# Patient Record
Sex: MALE | Race: BLACK OR AFRICAN AMERICAN | NOT HISPANIC OR LATINO
[De-identification: names, ages, dates, MRNs, and addresses within clinical notes are randomized per-mention and may not be internally consistent; named-entity substitution may affect disease eponyms.]

---

## 2017-02-01 ENCOUNTER — APPOINTMENT (OUTPATIENT)
Dept: NEPHROLOGY | Facility: CLINIC | Age: 52
End: 2017-02-01

## 2017-02-01 VITALS — SYSTOLIC BLOOD PRESSURE: 135 MMHG | HEART RATE: 76 BPM | DIASTOLIC BLOOD PRESSURE: 90 MMHG

## 2017-02-12 ENCOUNTER — FORM ENCOUNTER (OUTPATIENT)
Age: 52
End: 2017-02-12

## 2017-02-13 ENCOUNTER — OUTPATIENT (OUTPATIENT)
Dept: OUTPATIENT SERVICES | Facility: HOSPITAL | Age: 52
LOS: 1 days | End: 2017-02-13
Payer: MEDICAID

## 2017-02-13 PROCEDURE — 74160 CT ABDOMEN W/CONTRAST: CPT

## 2017-03-01 ENCOUNTER — LABORATORY RESULT (OUTPATIENT)
Age: 52
End: 2017-03-01

## 2017-03-01 ENCOUNTER — APPOINTMENT (OUTPATIENT)
Dept: NEPHROLOGY | Facility: CLINIC | Age: 52
End: 2017-03-01

## 2017-03-01 VITALS — SYSTOLIC BLOOD PRESSURE: 111 MMHG | DIASTOLIC BLOOD PRESSURE: 83 MMHG | HEART RATE: 84 BPM

## 2017-03-01 VITALS — SYSTOLIC BLOOD PRESSURE: 131 MMHG | DIASTOLIC BLOOD PRESSURE: 95 MMHG

## 2017-03-04 LAB
ALBUMIN SERPL ELPH-MCNC: 4.8 G/DL
ALP BLD-CCNC: 58 U/L
ALT SERPL-CCNC: 96 U/L
ANION GAP SERPL CALC-SCNC: 15 MMOL/L
APPEARANCE: CLEAR
AST SERPL-CCNC: 54 U/L
BASOPHILS # BLD AUTO: 0.02 K/UL
BASOPHILS NFR BLD AUTO: 0.4 %
BILIRUB SERPL-MCNC: 0.7 MG/DL
BILIRUBIN URINE: NEGATIVE
BLOOD URINE: NEGATIVE
BUN SERPL-MCNC: 14 MG/DL
C TRACH RRNA SPEC QL NAA+PROBE: NORMAL
CALCIUM SERPL-MCNC: 9.7 MG/DL
CALCIUM SERPL-MCNC: 9.7 MG/DL
CHLORIDE SERPL-SCNC: 100 MMOL/L
CHOLEST SERPL-MCNC: 185 MG/DL
CHOLEST/HDLC SERPL: 3.9 RATIO
CO2 SERPL-SCNC: 26 MMOL/L
COLOR: YELLOW
CREAT SERPL-MCNC: 1.19 MG/DL
CREAT SPEC-SCNC: 124 MG/DL
EOSINOPHIL # BLD AUTO: 0.05 K/UL
EOSINOPHIL NFR BLD AUTO: 1 %
GLUCOSE QUALITATIVE U: NORMAL MG/DL
GLUCOSE SERPL-MCNC: 113 MG/DL
HBA1C MFR BLD HPLC: 5.9 %
HBV SURFACE AG SER QL: NONREACTIVE
HCT VFR BLD CALC: 43.5 %
HCV AB SER QL: NONREACTIVE
HCV S/CO RATIO: 0.28 S/CO
HDLC SERPL-MCNC: 48 MG/DL
HGB BLD-MCNC: 14.8 G/DL
HIV1+2 AB SPEC QL IA.RAPID: NONREACTIVE
IMM GRANULOCYTES NFR BLD AUTO: 0 %
KETONES URINE: NEGATIVE
LDLC SERPL CALC-MCNC: 111 MG/DL
LEUKOCYTE ESTERASE URINE: NEGATIVE
LYMPHOCYTES # BLD AUTO: 2.43 K/UL
LYMPHOCYTES NFR BLD AUTO: 48.3 %
MAGNESIUM SERPL-MCNC: 2.1 MG/DL
MAN DIFF?: NORMAL
MCHC RBC-ENTMCNC: 28.1 PG
MCHC RBC-ENTMCNC: 34 GM/DL
MCV RBC AUTO: 82.5 FL
MICROALBUMIN 24H UR DL<=1MG/L-MCNC: 0.7 MG/DL
MICROALBUMIN/CREAT 24H UR-RTO: 6 UG/MG
MONOCYTES # BLD AUTO: 0.32 K/UL
MONOCYTES NFR BLD AUTO: 6.4 %
N GONORRHOEA RRNA SPEC QL NAA+PROBE: NORMAL
NEUTROPHILS # BLD AUTO: 2.21 K/UL
NEUTROPHILS NFR BLD AUTO: 43.9 %
NITRITE URINE: NEGATIVE
PARATHYROID HORMONE INTACT: 27 PG/ML
PH URINE: 5.5
PHOSPHATE SERPL-MCNC: 3.2 MG/DL
PLATELET # BLD AUTO: 215 K/UL
POTASSIUM SERPL-SCNC: 4.1 MMOL/L
PROT SERPL-MCNC: 8.1 G/DL
PROTEIN URINE: NEGATIVE MG/DL
PSA SERPL-MCNC: 1.15 NG/ML
RBC # BLD: 5.27 M/UL
RBC # FLD: 13.7 %
SODIUM SERPL-SCNC: 141 MMOL/L
SOURCE AMPLIFICATION: NORMAL
SPECIFIC GRAVITY URINE: 1.02
T PALLIDUM AB SER QL IA: POSITIVE
TRIGL SERPL-MCNC: 131 MG/DL
TSH SERPL-ACNC: 3.85 UIU/ML
URATE SERPL-MCNC: 6 MG/DL
UROBILINOGEN URINE: NORMAL MG/DL
WBC # FLD AUTO: 5.03 K/UL

## 2017-04-06 ENCOUNTER — OTHER (OUTPATIENT)
Age: 52
End: 2017-04-06

## 2017-04-07 ENCOUNTER — APPOINTMENT (OUTPATIENT)
Dept: INTERNAL MEDICINE | Facility: CLINIC | Age: 52
End: 2017-04-07

## 2017-04-07 VITALS
SYSTOLIC BLOOD PRESSURE: 120 MMHG | DIASTOLIC BLOOD PRESSURE: 90 MMHG | WEIGHT: 255.8 LBS | HEIGHT: 72 IN | HEART RATE: 110 BPM | BODY MASS INDEX: 34.65 KG/M2 | TEMPERATURE: 97.9 F | OXYGEN SATURATION: 98 %

## 2017-04-18 ENCOUNTER — OTHER (OUTPATIENT)
Age: 52
End: 2017-04-18

## 2017-06-30 ENCOUNTER — LABORATORY RESULT (OUTPATIENT)
Age: 52
End: 2017-06-30

## 2017-06-30 ENCOUNTER — APPOINTMENT (OUTPATIENT)
Dept: NEPHROLOGY | Facility: CLINIC | Age: 52
End: 2017-06-30

## 2017-06-30 VITALS — HEART RATE: 85 BPM | SYSTOLIC BLOOD PRESSURE: 128 MMHG | DIASTOLIC BLOOD PRESSURE: 86 MMHG

## 2017-06-30 VITALS — HEART RATE: 86 BPM | SYSTOLIC BLOOD PRESSURE: 110 MMHG | DIASTOLIC BLOOD PRESSURE: 80 MMHG

## 2017-06-30 DIAGNOSIS — R76.8 OTHER SPECIFIED ABNORMAL IMMUNOLOGICAL FINDINGS IN SERUM: ICD-10-CM

## 2017-07-03 ENCOUNTER — OTHER (OUTPATIENT)
Age: 52
End: 2017-07-03

## 2017-07-06 ENCOUNTER — APPOINTMENT (OUTPATIENT)
Dept: INTERNAL MEDICINE | Facility: CLINIC | Age: 52
End: 2017-07-06

## 2017-07-06 ENCOUNTER — LABORATORY RESULT (OUTPATIENT)
Age: 52
End: 2017-07-06

## 2017-07-06 VITALS
HEIGHT: 72 IN | DIASTOLIC BLOOD PRESSURE: 95 MMHG | BODY MASS INDEX: 31.83 KG/M2 | TEMPERATURE: 98.7 F | OXYGEN SATURATION: 97 % | HEART RATE: 90 BPM | SYSTOLIC BLOOD PRESSURE: 120 MMHG | WEIGHT: 235 LBS

## 2017-07-06 DIAGNOSIS — R03.0 ELEVATED BLOOD-PRESSURE READING, W/OUT DIAGNOSIS OF HYPERTENSION: ICD-10-CM

## 2017-07-06 DIAGNOSIS — Z23 ENCOUNTER FOR IMMUNIZATION: ICD-10-CM

## 2017-07-06 DIAGNOSIS — J20.8 ACUTE BRONCHITIS DUE TO OTHER SPECIFIED ORGANISMS: ICD-10-CM

## 2017-07-06 DIAGNOSIS — Z87.898 PERSONAL HISTORY OF OTHER SPECIFIED CONDITIONS: ICD-10-CM

## 2017-07-06 DIAGNOSIS — N28.1 CYST OF KIDNEY, ACQUIRED: ICD-10-CM

## 2017-07-06 DIAGNOSIS — Z92.29 PERSONAL HISTORY OF OTHER DRUG THERAPY: ICD-10-CM

## 2017-07-06 DIAGNOSIS — Z87.448 PERSONAL HISTORY OF OTHER DISEASES OF URINARY SYSTEM: ICD-10-CM

## 2017-07-07 LAB
ALBUMIN SERPL ELPH-MCNC: 5 G/DL
ALP BLD-CCNC: 73 U/L
ALT SERPL-CCNC: 69 U/L
ANION GAP SERPL CALC-SCNC: 17 MMOL/L
AST SERPL-CCNC: 47 U/L
BASOPHILS # BLD AUTO: 0.01 K/UL
BASOPHILS NFR BLD AUTO: 0.2 %
BILIRUB SERPL-MCNC: 1 MG/DL
BUN SERPL-MCNC: 17 MG/DL
C TRACH RRNA SPEC QL NAA+PROBE: NORMAL
CALCIUM SERPL-MCNC: 9.9 MG/DL
CHLORIDE SERPL-SCNC: 103 MMOL/L
CHOLEST SERPL-MCNC: 149 MG/DL
CHOLEST/HDLC SERPL: 3.5 RATIO
CO2 SERPL-SCNC: 23 MMOL/L
CREAT SERPL-MCNC: 1.34 MG/DL
EOSINOPHIL # BLD AUTO: 0.08 K/UL
EOSINOPHIL NFR BLD AUTO: 1.5 %
GLUCOSE SERPL-MCNC: 92 MG/DL
HBA1C MFR BLD HPLC: 5.6 %
HCT VFR BLD CALC: 44.1 %
HCV AB SER QL: NONREACTIVE
HCV S/CO RATIO: 0.25 S/CO
HDLC SERPL-MCNC: 42 MG/DL
HGB BLD-MCNC: 14.8 G/DL
HIV1+2 AB SPEC QL IA.RAPID: NONREACTIVE
IMM GRANULOCYTES NFR BLD AUTO: 0.2 %
LDLC SERPL CALC-MCNC: 60 MG/DL
LYMPHOCYTES # BLD AUTO: 2 K/UL
LYMPHOCYTES NFR BLD AUTO: 37.2 %
MAN DIFF?: NORMAL
MCHC RBC-ENTMCNC: 28.4 PG
MCHC RBC-ENTMCNC: 33.6 GM/DL
MCV RBC AUTO: 84.6 FL
MONOCYTES # BLD AUTO: 0.33 K/UL
MONOCYTES NFR BLD AUTO: 6.1 %
N GONORRHOEA RRNA SPEC QL NAA+PROBE: NORMAL
NEUTROPHILS # BLD AUTO: 2.94 K/UL
NEUTROPHILS NFR BLD AUTO: 54.8 %
PLATELET # BLD AUTO: 180 K/UL
POTASSIUM SERPL-SCNC: 4.2 MMOL/L
PROT SERPL-MCNC: 8 G/DL
RBC # BLD: 5.21 M/UL
RBC # FLD: 14.4 %
SODIUM SERPL-SCNC: 143 MMOL/L
SOURCE AMPLIFICATION: NORMAL
TRIGL SERPL-MCNC: 233 MG/DL
TSH SERPL-ACNC: 3.36 UIU/ML
WBC # FLD AUTO: 5.37 K/UL

## 2017-07-09 LAB
25(OH)D3 SERPL-MCNC: 37.8 NG/ML
ALBUMIN SERPL ELPH-MCNC: 5.1 G/DL
ALP BLD-CCNC: 65 U/L
ALT SERPL-CCNC: 47 U/L
ANION GAP SERPL CALC-SCNC: 19 MMOL/L
APPEARANCE: CLEAR
AST SERPL-CCNC: 32 U/L
BASOPHILS # BLD AUTO: 0.02 K/UL
BASOPHILS NFR BLD AUTO: 0.4 %
BILIRUB SERPL-MCNC: 0.9 MG/DL
BILIRUBIN URINE: NEGATIVE
BLOOD URINE: ABNORMAL
BUN SERPL-MCNC: 17 MG/DL
CALCIUM SERPL-MCNC: 10.1 MG/DL
CALCIUM SERPL-MCNC: 10.1 MG/DL
CHLORIDE SERPL-SCNC: 100 MMOL/L
CHOLEST SERPL-MCNC: 152 MG/DL
CHOLEST/HDLC SERPL: 3.2 RATIO
CO2 SERPL-SCNC: 25 MMOL/L
COLOR: YELLOW
CREAT SERPL-MCNC: 1.4 MG/DL
CREAT SPEC-SCNC: 221 MG/DL
EOSINOPHIL # BLD AUTO: 0.13 K/UL
EOSINOPHIL NFR BLD AUTO: 2.5 %
GLUCOSE QUALITATIVE U: NORMAL MG/DL
GLUCOSE SERPL-MCNC: 104 MG/DL
HBA1C MFR BLD HPLC: 5.7 %
HCT VFR BLD CALC: 43.1 %
HDLC SERPL-MCNC: 48 MG/DL
HGB BLD-MCNC: 14.5 G/DL
IMM GRANULOCYTES NFR BLD AUTO: 0.2 %
KETONES URINE: NEGATIVE
LDLC SERPL CALC-MCNC: 88 MG/DL
LEUKOCYTE ESTERASE URINE: NEGATIVE
LYMPHOCYTES # BLD AUTO: 1.96 K/UL
LYMPHOCYTES NFR BLD AUTO: 37.3 %
MAGNESIUM SERPL-MCNC: 2.2 MG/DL
MAN DIFF?: NORMAL
MCHC RBC-ENTMCNC: 28.1 PG
MCHC RBC-ENTMCNC: 33.6 GM/DL
MCV RBC AUTO: 83.5 FL
MICROALBUMIN 24H UR DL<=1MG/L-MCNC: 1.6 MG/DL
MICROALBUMIN/CREAT 24H UR-RTO: 7 MG/G
MONOCYTES # BLD AUTO: 0.4 K/UL
MONOCYTES NFR BLD AUTO: 7.6 %
NEUTROPHILS # BLD AUTO: 2.74 K/UL
NEUTROPHILS NFR BLD AUTO: 52 %
NITRITE URINE: NEGATIVE
PARATHYROID HORMONE INTACT: 24 PG/ML
PH URINE: 5.5
PHOSPHATE SERPL-MCNC: 2.9 MG/DL
PLATELET # BLD AUTO: 177 K/UL
POTASSIUM SERPL-SCNC: 4.2 MMOL/L
PROT SERPL-MCNC: 7.9 G/DL
PROTEIN URINE: NEGATIVE MG/DL
RBC # BLD: 5.16 M/UL
RBC # FLD: 13.4 %
SODIUM SERPL-SCNC: 144 MMOL/L
SPECIFIC GRAVITY URINE: 1.02
T PALLIDUM AB SER DONR QL IF: REACTIVE
T PALLIDUM AB SER QL IA: POSITIVE
TRIGL SERPL-MCNC: 78 MG/DL
TSH SERPL-ACNC: 2.9 UIU/ML
URATE SERPL-MCNC: 6.1 MG/DL
UROBILINOGEN URINE: NORMAL MG/DL
WBC # FLD AUTO: 5.26 K/UL

## 2017-07-12 LAB — T PALLIDUM AB SER QL IA: POSITIVE

## 2017-09-06 ENCOUNTER — OTHER (OUTPATIENT)
Age: 52
End: 2017-09-06

## 2017-09-14 ENCOUNTER — TRANSCRIPTION ENCOUNTER (OUTPATIENT)
Age: 52
End: 2017-09-14

## 2017-09-18 ENCOUNTER — TRANSCRIPTION ENCOUNTER (OUTPATIENT)
Age: 52
End: 2017-09-18

## 2017-10-17 ENCOUNTER — LABORATORY RESULT (OUTPATIENT)
Age: 52
End: 2017-10-17

## 2017-10-17 ENCOUNTER — APPOINTMENT (OUTPATIENT)
Dept: INTERNAL MEDICINE | Facility: CLINIC | Age: 52
End: 2017-10-17
Payer: MEDICAID

## 2017-10-17 VITALS
SYSTOLIC BLOOD PRESSURE: 120 MMHG | WEIGHT: 227 LBS | TEMPERATURE: 97.5 F | HEART RATE: 92 BPM | DIASTOLIC BLOOD PRESSURE: 81 MMHG | OXYGEN SATURATION: 98 % | HEIGHT: 72 IN | BODY MASS INDEX: 30.75 KG/M2

## 2017-10-17 PROCEDURE — 36415 COLL VENOUS BLD VENIPUNCTURE: CPT

## 2017-10-17 PROCEDURE — 90686 IIV4 VACC NO PRSV 0.5 ML IM: CPT

## 2017-10-17 PROCEDURE — G0008: CPT

## 2017-10-17 PROCEDURE — 99214 OFFICE O/P EST MOD 30 MIN: CPT | Mod: 25

## 2017-10-18 LAB
ALBUMIN SERPL ELPH-MCNC: 5 G/DL
ALP BLD-CCNC: 64 U/L
ALT SERPL-CCNC: 53 U/L
ANION GAP SERPL CALC-SCNC: 12 MMOL/L
AST SERPL-CCNC: 47 U/L
BASOPHILS # BLD AUTO: 0.02 K/UL
BASOPHILS NFR BLD AUTO: 0.4 %
BILIRUB SERPL-MCNC: 0.6 MG/DL
BUN SERPL-MCNC: 18 MG/DL
C TRACH RRNA SPEC QL NAA+PROBE: NOT DETECTED
CALCIUM SERPL-MCNC: 9.8 MG/DL
CHLORIDE SERPL-SCNC: 103 MMOL/L
CO2 SERPL-SCNC: 24 MMOL/L
CREAT SERPL-MCNC: 1.41 MG/DL
EOSINOPHIL # BLD AUTO: 0.1 K/UL
EOSINOPHIL NFR BLD AUTO: 1.9 %
GLUCOSE SERPL-MCNC: 96 MG/DL
HBA1C MFR BLD HPLC: 5.7 %
HCT VFR BLD CALC: 41.9 %
HCV AB SER QL: NONREACTIVE
HCV S/CO RATIO: 0.18 S/CO
HGB BLD-MCNC: 14.5 G/DL
HIV1+2 AB SPEC QL IA.RAPID: NONREACTIVE
HSV 1+2 IGG SER IA-IMP: POSITIVE
HSV 1+2 IGG SER IA-IMP: POSITIVE
HSV1 IGG SER QL: 31.2 INDEX
HSV2 IGG SER QL: 3.18 INDEX
IMM GRANULOCYTES NFR BLD AUTO: 0.2 %
LYMPHOCYTES # BLD AUTO: 1.87 K/UL
LYMPHOCYTES NFR BLD AUTO: 36.5 %
MAN DIFF?: NORMAL
MCHC RBC-ENTMCNC: 28.9 PG
MCHC RBC-ENTMCNC: 34.6 GM/DL
MCV RBC AUTO: 83.5 FL
MONOCYTES # BLD AUTO: 0.26 K/UL
MONOCYTES NFR BLD AUTO: 5.1 %
N GONORRHOEA RRNA SPEC QL NAA+PROBE: NOT DETECTED
NEUTROPHILS # BLD AUTO: 2.87 K/UL
NEUTROPHILS NFR BLD AUTO: 55.9 %
PLATELET # BLD AUTO: 180 K/UL
POTASSIUM SERPL-SCNC: 4 MMOL/L
PROT SERPL-MCNC: 8.2 G/DL
RBC # BLD: 5.02 M/UL
RBC # FLD: 13.2 %
SODIUM SERPL-SCNC: 139 MMOL/L
SOURCE AMPLIFICATION: NORMAL
WBC # FLD AUTO: 5.13 K/UL

## 2017-10-30 ENCOUNTER — APPOINTMENT (OUTPATIENT)
Dept: NEPHROLOGY | Facility: CLINIC | Age: 52
End: 2017-10-30
Payer: MEDICAID

## 2017-10-30 VITALS — DIASTOLIC BLOOD PRESSURE: 104 MMHG | HEART RATE: 87 BPM | SYSTOLIC BLOOD PRESSURE: 125 MMHG

## 2017-10-30 VITALS — BODY MASS INDEX: 30.79 KG/M2 | WEIGHT: 227 LBS

## 2017-10-30 VITALS — SYSTOLIC BLOOD PRESSURE: 127 MMHG | DIASTOLIC BLOOD PRESSURE: 90 MMHG | HEART RATE: 76 BPM

## 2017-10-30 LAB — T PALLIDUM AB SER QL IA: POSITIVE

## 2017-10-30 PROCEDURE — 99215 OFFICE O/P EST HI 40 MIN: CPT

## 2017-11-03 LAB
APPEARANCE: CLEAR
BILIRUBIN URINE: NEGATIVE
BLOOD URINE: NEGATIVE
COLOR: YELLOW
CREAT SPEC-SCNC: 122 MG/DL
GLUCOSE QUALITATIVE U: NEGATIVE MG/DL
KETONES URINE: NEGATIVE
LEUKOCYTE ESTERASE URINE: NEGATIVE
MICROALBUMIN 24H UR DL<=1MG/L-MCNC: 0.8 MG/DL
MICROALBUMIN/CREAT 24H UR-RTO: 7 MG/G
NITRITE URINE: NEGATIVE
PH URINE: 5
PROTEIN URINE: NEGATIVE MG/DL
SPECIFIC GRAVITY URINE: 1.02
UROBILINOGEN URINE: NEGATIVE MG/DL

## 2017-11-07 ENCOUNTER — OTHER (OUTPATIENT)
Age: 52
End: 2017-11-07

## 2018-01-23 ENCOUNTER — LABORATORY RESULT (OUTPATIENT)
Age: 53
End: 2018-01-23

## 2018-01-23 ENCOUNTER — APPOINTMENT (OUTPATIENT)
Dept: INTERNAL MEDICINE | Facility: CLINIC | Age: 53
End: 2018-01-23
Payer: MEDICAID

## 2018-01-23 VITALS
TEMPERATURE: 98.7 F | OXYGEN SATURATION: 98 % | SYSTOLIC BLOOD PRESSURE: 127 MMHG | HEIGHT: 72 IN | DIASTOLIC BLOOD PRESSURE: 92 MMHG | HEART RATE: 94 BPM | WEIGHT: 243 LBS | BODY MASS INDEX: 32.91 KG/M2

## 2018-01-23 PROCEDURE — 36415 COLL VENOUS BLD VENIPUNCTURE: CPT

## 2018-01-23 PROCEDURE — 99214 OFFICE O/P EST MOD 30 MIN: CPT | Mod: 25

## 2018-01-24 LAB
ALBUMIN SERPL ELPH-MCNC: 4.9 G/DL
ALP BLD-CCNC: 58 U/L
ALT SERPL-CCNC: 58 U/L
ANION GAP SERPL CALC-SCNC: 16 MMOL/L
AST SERPL-CCNC: 36 U/L
BASOPHILS # BLD AUTO: 0.02 K/UL
BASOPHILS NFR BLD AUTO: 0.4 %
BILIRUB SERPL-MCNC: 0.6 MG/DL
BUN SERPL-MCNC: 18 MG/DL
CALCIUM SERPL-MCNC: 10.1 MG/DL
CHLORIDE SERPL-SCNC: 100 MMOL/L
CO2 SERPL-SCNC: 23 MMOL/L
CREAT SERPL-MCNC: 1.4 MG/DL
EOSINOPHIL # BLD AUTO: 0.09 K/UL
EOSINOPHIL NFR BLD AUTO: 1.7 %
GLUCOSE SERPL-MCNC: 100 MG/DL
HBA1C MFR BLD HPLC: 5.6 %
HCT VFR BLD CALC: 46.1 %
HCV AB SER QL: NONREACTIVE
HCV S/CO RATIO: 0.16 S/CO
HGB BLD-MCNC: 15.4 G/DL
HIV1+2 AB SPEC QL IA.RAPID: NONREACTIVE
HSV 1+2 IGG SER IA-IMP: POSITIVE
HSV 1+2 IGG SER IA-IMP: POSITIVE
HSV1 IGG SER QL: 31.8 INDEX
HSV2 IGG SER QL: 2.53 INDEX
IMM GRANULOCYTES NFR BLD AUTO: 0.4 %
LYMPHOCYTES # BLD AUTO: 2.33 K/UL
LYMPHOCYTES NFR BLD AUTO: 44.8 %
MAN DIFF?: NORMAL
MCHC RBC-ENTMCNC: 29 PG
MCHC RBC-ENTMCNC: 33.4 GM/DL
MCV RBC AUTO: 86.8 FL
MONOCYTES # BLD AUTO: 0.28 K/UL
MONOCYTES NFR BLD AUTO: 5.4 %
NEUTROPHILS # BLD AUTO: 2.46 K/UL
NEUTROPHILS NFR BLD AUTO: 47.3 %
PLATELET # BLD AUTO: 185 K/UL
POTASSIUM SERPL-SCNC: 4.3 MMOL/L
PROT SERPL-MCNC: 8.5 G/DL
RBC # BLD: 5.31 M/UL
RBC # FLD: 13.3 %
SODIUM SERPL-SCNC: 139 MMOL/L
WBC # FLD AUTO: 5.2 K/UL

## 2018-01-25 LAB
C TRACH RRNA SPEC QL NAA+PROBE: NOT DETECTED
N GONORRHOEA RRNA SPEC QL NAA+PROBE: NOT DETECTED
SOURCE AMPLIFICATION: NORMAL

## 2018-01-30 ENCOUNTER — APPOINTMENT (OUTPATIENT)
Dept: NEPHROLOGY | Facility: CLINIC | Age: 53
End: 2018-01-30
Payer: MEDICAID

## 2018-01-30 VITALS — DIASTOLIC BLOOD PRESSURE: 83 MMHG | HEART RATE: 72 BPM | SYSTOLIC BLOOD PRESSURE: 123 MMHG

## 2018-01-30 VITALS — WEIGHT: 245 LBS | BODY MASS INDEX: 33.23 KG/M2

## 2018-01-30 LAB — T PALLIDUM AB SER QL IA: POSITIVE

## 2018-01-30 PROCEDURE — 99214 OFFICE O/P EST MOD 30 MIN: CPT

## 2018-04-29 ENCOUNTER — LABORATORY RESULT (OUTPATIENT)
Age: 53
End: 2018-04-29

## 2018-05-01 ENCOUNTER — APPOINTMENT (OUTPATIENT)
Dept: INTERNAL MEDICINE | Facility: CLINIC | Age: 53
End: 2018-05-01
Payer: MEDICAID

## 2018-05-01 VITALS
HEART RATE: 90 BPM | DIASTOLIC BLOOD PRESSURE: 95 MMHG | BODY MASS INDEX: 33.72 KG/M2 | SYSTOLIC BLOOD PRESSURE: 137 MMHG | HEIGHT: 72 IN | WEIGHT: 249 LBS | TEMPERATURE: 98.5 F

## 2018-05-01 PROCEDURE — 36415 COLL VENOUS BLD VENIPUNCTURE: CPT

## 2018-05-01 PROCEDURE — 99214 OFFICE O/P EST MOD 30 MIN: CPT | Mod: 25

## 2018-05-03 LAB
C TRACH RRNA SPEC QL NAA+PROBE: NOT DETECTED
HCV AB SER QL: NONREACTIVE
HCV S/CO RATIO: 0.25 S/CO
HSV 1+2 IGG SER IA-IMP: POSITIVE
HSV 1+2 IGG SER IA-IMP: POSITIVE
HSV1 IGG SER QL: 31.4 INDEX
HSV2 IGG SER QL: 2.15 INDEX
N GONORRHOEA RRNA SPEC QL NAA+PROBE: NOT DETECTED
SOURCE AMPLIFICATION: NORMAL

## 2018-05-14 ENCOUNTER — FORM ENCOUNTER (OUTPATIENT)
Age: 53
End: 2018-05-14

## 2018-05-15 ENCOUNTER — OUTPATIENT (OUTPATIENT)
Dept: OUTPATIENT SERVICES | Facility: HOSPITAL | Age: 53
LOS: 1 days | End: 2018-05-15
Payer: MEDICAID

## 2018-05-15 ENCOUNTER — APPOINTMENT (OUTPATIENT)
Dept: MRI IMAGING | Facility: HOSPITAL | Age: 53
End: 2018-05-15
Payer: MEDICAID

## 2018-05-15 PROCEDURE — 73221 MRI JOINT UPR EXTREM W/O DYE: CPT

## 2018-05-15 PROCEDURE — 73221 MRI JOINT UPR EXTREM W/O DYE: CPT | Mod: 26,RT

## 2018-05-21 ENCOUNTER — OUTPATIENT (OUTPATIENT)
Dept: OUTPATIENT SERVICES | Facility: HOSPITAL | Age: 53
LOS: 1 days | End: 2018-05-21
Payer: MEDICAID

## 2018-05-21 ENCOUNTER — APPOINTMENT (OUTPATIENT)
Dept: ORTHOPEDIC SURGERY | Facility: CLINIC | Age: 53
End: 2018-05-21
Payer: MEDICAID

## 2018-05-21 VITALS — BODY MASS INDEX: 33.18 KG/M2 | WEIGHT: 245 LBS | HEIGHT: 72 IN

## 2018-05-21 PROCEDURE — 99203 OFFICE O/P NEW LOW 30 MIN: CPT

## 2018-05-21 PROCEDURE — 73030 X-RAY EXAM OF SHOULDER: CPT

## 2018-05-21 PROCEDURE — 73030 X-RAY EXAM OF SHOULDER: CPT | Mod: 26,RT

## 2018-05-22 ENCOUNTER — APPOINTMENT (OUTPATIENT)
Dept: ORTHOPEDIC SURGERY | Facility: CLINIC | Age: 53
End: 2018-05-22

## 2018-05-29 ENCOUNTER — RX RENEWAL (OUTPATIENT)
Age: 53
End: 2018-05-29

## 2018-06-01 ENCOUNTER — APPOINTMENT (OUTPATIENT)
Dept: ORTHOPEDIC SURGERY | Facility: CLINIC | Age: 53
End: 2018-06-01
Payer: MEDICAID

## 2018-06-01 DIAGNOSIS — Z86.39 PERSONAL HISTORY OF OTHER ENDOCRINE, NUTRITIONAL AND METABOLIC DISEASE: ICD-10-CM

## 2018-06-01 PROCEDURE — 99214 OFFICE O/P EST MOD 30 MIN: CPT | Mod: 25

## 2018-06-01 PROCEDURE — 20610 DRAIN/INJ JOINT/BURSA W/O US: CPT | Mod: RT

## 2018-06-09 LAB
ALBUMIN SERPL ELPH-MCNC: 5.2 G/DL
ALP BLD-CCNC: 68 U/L
ALT SERPL-CCNC: 95 U/L
ANION GAP SERPL CALC-SCNC: 14 MMOL/L
AST SERPL-CCNC: 56 U/L
BILIRUB SERPL-MCNC: 0.7 MG/DL
BUN SERPL-MCNC: 17 MG/DL
CALCIUM SERPL-MCNC: 9.8 MG/DL
CHLORIDE SERPL-SCNC: 101 MMOL/L
CO2 SERPL-SCNC: 27 MMOL/L
CREAT SERPL-MCNC: 1.28 MG/DL
GLUCOSE SERPL-MCNC: 100 MG/DL
HIV1+2 AB SPEC QL IA.RAPID: NONREACTIVE
POTASSIUM SERPL-SCNC: 4.4 MMOL/L
PROT SERPL-MCNC: 8.1 G/DL
SODIUM SERPL-SCNC: 142 MMOL/L
T PALLIDUM AB SER QL IA: POSITIVE

## 2018-06-18 ENCOUNTER — APPOINTMENT (OUTPATIENT)
Dept: ORTHOPEDIC SURGERY | Facility: CLINIC | Age: 53
End: 2018-06-18

## 2018-06-26 ENCOUNTER — APPOINTMENT (OUTPATIENT)
Dept: NEPHROLOGY | Facility: CLINIC | Age: 53
End: 2018-06-26
Payer: MEDICAID

## 2018-06-26 VITALS — HEART RATE: 98 BPM | DIASTOLIC BLOOD PRESSURE: 88 MMHG | SYSTOLIC BLOOD PRESSURE: 120 MMHG

## 2018-06-26 PROCEDURE — 99215 OFFICE O/P EST HI 40 MIN: CPT | Mod: 25

## 2018-06-26 PROCEDURE — 36415 COLL VENOUS BLD VENIPUNCTURE: CPT

## 2018-06-30 LAB
ALBUMIN SERPL ELPH-MCNC: 5.2 G/DL
ALP BLD-CCNC: 67 U/L
ALT SERPL-CCNC: 51 U/L
ANION GAP SERPL CALC-SCNC: 16 MMOL/L
APPEARANCE: CLEAR
AST SERPL-CCNC: 32 U/L
BASOPHILS # BLD AUTO: 0.01 K/UL
BASOPHILS NFR BLD AUTO: 0.2 %
BILIRUB SERPL-MCNC: 0.7 MG/DL
BILIRUBIN URINE: NEGATIVE
BLOOD URINE: NEGATIVE
BUN SERPL-MCNC: 20 MG/DL
CALCIUM SERPL-MCNC: 10 MG/DL
CALCIUM SERPL-MCNC: 10 MG/DL
CHLORIDE SERPL-SCNC: 104 MMOL/L
CO2 SERPL-SCNC: 23 MMOL/L
COLOR: YELLOW
CREAT SERPL-MCNC: 1.44 MG/DL
CREAT SPEC-SCNC: 84 MG/DL
EOSINOPHIL # BLD AUTO: 0.04 K/UL
EOSINOPHIL NFR BLD AUTO: 0.7 %
GLUCOSE QUALITATIVE U: NEGATIVE MG/DL
GLUCOSE SERPL-MCNC: 103 MG/DL
HBA1C MFR BLD HPLC: 5.6 %
HCT VFR BLD CALC: 45.9 %
HGB BLD-MCNC: 15.4 G/DL
IMM GRANULOCYTES NFR BLD AUTO: 0.2 %
KETONES URINE: NEGATIVE
LEUKOCYTE ESTERASE URINE: NEGATIVE
LYMPHOCYTES # BLD AUTO: 2.41 K/UL
LYMPHOCYTES NFR BLD AUTO: 44.1 %
MAN DIFF?: NORMAL
MCHC RBC-ENTMCNC: 28.3 PG
MCHC RBC-ENTMCNC: 33.6 GM/DL
MCV RBC AUTO: 84.4 FL
MICROALBUMIN 24H UR DL<=1MG/L-MCNC: 1.5 MG/DL
MICROALBUMIN/CREAT 24H UR-RTO: 18 MG/G
MONOCYTES # BLD AUTO: 0.39 K/UL
MONOCYTES NFR BLD AUTO: 7.1 %
NEUTROPHILS # BLD AUTO: 2.6 K/UL
NEUTROPHILS NFR BLD AUTO: 47.7 %
NITRITE URINE: NEGATIVE
PARATHYROID HORMONE INTACT: 21 PG/ML
PH URINE: 5
PHOSPHATE SERPL-MCNC: 2.7 MG/DL
PLATELET # BLD AUTO: 190 K/UL
POTASSIUM SERPL-SCNC: 4.1 MMOL/L
PROT SERPL-MCNC: 8.1 G/DL
PROTEIN URINE: NEGATIVE MG/DL
RBC # BLD: 5.44 M/UL
RBC # FLD: 13.6 %
SODIUM SERPL-SCNC: 143 MMOL/L
SPECIFIC GRAVITY URINE: 1.01
UROBILINOGEN URINE: NEGATIVE MG/DL
WBC # FLD AUTO: 5.46 K/UL

## 2018-07-21 ENCOUNTER — OTHER (OUTPATIENT)
Age: 53
End: 2018-07-21

## 2018-07-23 ENCOUNTER — APPOINTMENT (OUTPATIENT)
Dept: ORTHOPEDIC SURGERY | Facility: CLINIC | Age: 53
End: 2018-07-23
Payer: MEDICAID

## 2018-07-23 DIAGNOSIS — M19.011 PRIMARY OSTEOARTHRITIS, RIGHT SHOULDER: ICD-10-CM

## 2018-07-23 PROCEDURE — 99214 OFFICE O/P EST MOD 30 MIN: CPT

## 2018-07-31 ENCOUNTER — LABORATORY RESULT (OUTPATIENT)
Age: 53
End: 2018-07-31

## 2018-07-31 ENCOUNTER — OTHER (OUTPATIENT)
Age: 53
End: 2018-07-31

## 2018-07-31 ENCOUNTER — APPOINTMENT (OUTPATIENT)
Dept: INTERNAL MEDICINE | Facility: CLINIC | Age: 53
End: 2018-07-31
Payer: MEDICAID

## 2018-07-31 VITALS
HEIGHT: 72 IN | HEART RATE: 88 BPM | DIASTOLIC BLOOD PRESSURE: 95 MMHG | TEMPERATURE: 98.1 F | SYSTOLIC BLOOD PRESSURE: 128 MMHG | OXYGEN SATURATION: 98 % | BODY MASS INDEX: 34.4 KG/M2 | WEIGHT: 254 LBS

## 2018-07-31 PROCEDURE — 36415 COLL VENOUS BLD VENIPUNCTURE: CPT

## 2018-07-31 PROCEDURE — 99214 OFFICE O/P EST MOD 30 MIN: CPT | Mod: 25

## 2018-07-31 NOTE — HISTORY OF PRESENT ILLNESS
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 Has HLD, HLD, CRI, recently saw Dr Fraire.  Needs refills on meds including Truvada. He had a cold recently, but well now.  Going to [facility] for a week. 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 Compliant with meds. Has HTN and HLD, CRI. Feels well.  Compliant with meds. 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 He saw his nephrologist recently. He remains on Truvada and needs Q3 month labs. He feels well. He's lost 20 pounds in the last few months, by eating well. Exercising more. 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  Saw Dr Cook, who recommended QOD dosing due to CRI.  HTN better controlled on higher dose of ARB. Seeing Dr Fraire regularly.  Next labs due in 3 months. Recently became sexually active again, and wants to start Truvada. Aware that q3 mo visits required. No complaints otherwise feeling well and compliant w care/ meds.\par \par LAST VISIT  51 year-old gentleman with renal insufficiency, hypertension, hyperlipidemia here in followup. He has not yet started taking Truvada. He has had indiscretion with his diet in the last couple of months and has been indulging in richer food. Now on two blood pressure meds, and taking statin daily.  Compliant with medications.  Plans to lose more weight, get in better shape.  Had elevated LFTs; see liver CT; OK - US recommended.  Hasn't yet scheduled.

## 2018-07-31 NOTE — HEALTH RISK ASSESSMENT
[0] : 2) Feeling down, depressed, or hopeless: Not at all (0) [] : No [de-identified] : rare [[id number]] : 0

## 2018-07-31 NOTE — ASSESSMENT
[FreeTextEntry1] : Delightful 53 yr old with well controlled HTN, HLD\par HGA1C was normal\par \par f/u q 3 mo due to Truvada

## 2018-08-01 ENCOUNTER — OTHER (OUTPATIENT)
Age: 53
End: 2018-08-01

## 2018-08-01 DIAGNOSIS — B00.9 HERPESVIRAL INFECTION, UNSPECIFIED: ICD-10-CM

## 2018-08-01 LAB
ALBUMIN SERPL ELPH-MCNC: 5.3 G/DL
ALP BLD-CCNC: 55 U/L
ALT SERPL-CCNC: 52 U/L
ANION GAP SERPL CALC-SCNC: 17 MMOL/L
AST SERPL-CCNC: 37 U/L
BASOPHILS # BLD AUTO: 0.02 K/UL
BASOPHILS NFR BLD AUTO: 0.4 %
BILIRUB SERPL-MCNC: 0.8 MG/DL
BUN SERPL-MCNC: 15 MG/DL
C TRACH RRNA SPEC QL NAA+PROBE: NOT DETECTED
CALCIUM SERPL-MCNC: 9.4 MG/DL
CHLORIDE SERPL-SCNC: 98 MMOL/L
CHOLEST SERPL-MCNC: 144 MG/DL
CHOLEST/HDLC SERPL: 3.7 RATIO
CO2 SERPL-SCNC: 24 MMOL/L
CREAT SERPL-MCNC: 1.13 MG/DL
EOSINOPHIL # BLD AUTO: 0.11 K/UL
EOSINOPHIL NFR BLD AUTO: 2.2 %
GLUCOSE SERPL-MCNC: 97 MG/DL
HBA1C MFR BLD HPLC: 5.7 %
HCT VFR BLD CALC: 43.1 %
HCV AB SER QL: NONREACTIVE
HCV S/CO RATIO: 0.2 S/CO
HDLC SERPL-MCNC: 39 MG/DL
HGB BLD-MCNC: 15 G/DL
HIV1+2 AB SPEC QL IA.RAPID: NONREACTIVE
HSV 1+2 IGG SER IA-IMP: POSITIVE
HSV 1+2 IGG SER IA-IMP: POSITIVE
HSV1 IGG SER QL: 31.6 INDEX
HSV2 IGG SER QL: 3 INDEX
IMM GRANULOCYTES NFR BLD AUTO: 0.4 %
LDLC SERPL CALC-MCNC: 65 MG/DL
LYMPHOCYTES # BLD AUTO: 2.71 K/UL
LYMPHOCYTES NFR BLD AUTO: 53.9 %
MAN DIFF?: NORMAL
MCHC RBC-ENTMCNC: 29.1 PG
MCHC RBC-ENTMCNC: 34.8 GM/DL
MCV RBC AUTO: 83.5 FL
MONOCYTES # BLD AUTO: 0.38 K/UL
MONOCYTES NFR BLD AUTO: 7.6 %
N GONORRHOEA RRNA SPEC QL NAA+PROBE: NOT DETECTED
NEUTROPHILS # BLD AUTO: 1.79 K/UL
NEUTROPHILS NFR BLD AUTO: 35.5 %
PLATELET # BLD AUTO: 147 K/UL
POTASSIUM SERPL-SCNC: 4 MMOL/L
PROT SERPL-MCNC: 8 G/DL
RBC # BLD: 5.16 M/UL
RBC # FLD: 14 %
SODIUM SERPL-SCNC: 139 MMOL/L
SOURCE AMPLIFICATION: NORMAL
TRIGL SERPL-MCNC: 198 MG/DL
WBC # FLD AUTO: 5.03 K/UL

## 2018-08-03 LAB — T PALLIDUM AB SER QL IA: POSITIVE

## 2018-10-04 ENCOUNTER — APPOINTMENT (OUTPATIENT)
Dept: NEPHROLOGY | Facility: CLINIC | Age: 53
End: 2018-10-04
Payer: MEDICAID

## 2018-10-04 VITALS — DIASTOLIC BLOOD PRESSURE: 101 MMHG | SYSTOLIC BLOOD PRESSURE: 130 MMHG

## 2018-10-04 VITALS — WEIGHT: 256 LBS | BODY MASS INDEX: 34.72 KG/M2

## 2018-10-04 VITALS — DIASTOLIC BLOOD PRESSURE: 81 MMHG | SYSTOLIC BLOOD PRESSURE: 108 MMHG | HEART RATE: 86 BPM

## 2018-10-04 PROCEDURE — 99215 OFFICE O/P EST HI 40 MIN: CPT

## 2018-10-30 ENCOUNTER — APPOINTMENT (OUTPATIENT)
Dept: INTERNAL MEDICINE | Facility: CLINIC | Age: 53
End: 2018-10-30
Payer: MEDICAID

## 2018-10-30 VITALS
HEIGHT: 72 IN | SYSTOLIC BLOOD PRESSURE: 125 MMHG | HEART RATE: 88 BPM | DIASTOLIC BLOOD PRESSURE: 90 MMHG | TEMPERATURE: 97.4 F | BODY MASS INDEX: 33.46 KG/M2 | WEIGHT: 247 LBS | OXYGEN SATURATION: 95 %

## 2018-10-30 PROCEDURE — 90686 IIV4 VACC NO PRSV 0.5 ML IM: CPT

## 2018-10-30 PROCEDURE — 99214 OFFICE O/P EST MOD 30 MIN: CPT | Mod: 25

## 2018-10-30 PROCEDURE — 36415 COLL VENOUS BLD VENIPUNCTURE: CPT

## 2018-10-30 PROCEDURE — G0008: CPT

## 2018-10-30 NOTE — HISTORY OF PRESENT ILLNESS
[de-identified] : 53 yr old with HLD, CRI here in f/u, on Truvada, requisite labs needed and flu vaccine. Father ailing, 79, had another CVA. 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 Has HLD, HLD, CRI, recently saw Dr Fraire.  Needs refills on meds including Truvada. He had a cold recently, but well now.  Going to [facility] for a week. 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 Compliant with meds. Has HTN and HLD, CRI. Feels well.  Compliant with meds. 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 He saw his nephrologist recently. He remains on Truvada and needs Q3 month labs. He feels well. He's lost 20 pounds in the last few months, by eating well. Exercising more. 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  Saw Dr Cook, who recommended QOD dosing due to CRI.  HTN better controlled on higher dose of ARB. Seeing Dr Fraire regularly.  Next labs due in 3 months. Recently became sexually active again, and wants to start Truvada. Aware that q3 mo visits required. No complaints otherwise feeling well and compliant w care/ meds.\par \par LAST VISIT  51 year-old gentleman with renal insufficiency, hypertension, hyperlipidemia here in followup. He has not yet started taking Truvada. He has had indiscretion with his diet in the last couple of months and has been indulging in richer food. Now on two blood pressure meds, and taking statin daily.  Compliant with medications.  Plans to lose more weight, get in better shape.  Had elevated LFTs; see liver CT; OK - US recommended.  Hasn't yet scheduled.

## 2018-10-31 LAB
ALBUMIN SERPL ELPH-MCNC: 5 G/DL
ALP BLD-CCNC: 56 U/L
ALT SERPL-CCNC: 92 U/L
ANION GAP SERPL CALC-SCNC: 18 MMOL/L
AST SERPL-CCNC: 67 U/L
BILIRUB SERPL-MCNC: 0.9 MG/DL
BUN SERPL-MCNC: 20 MG/DL
C TRACH RRNA SPEC QL NAA+PROBE: NOT DETECTED
CALCIUM SERPL-MCNC: 10.2 MG/DL
CHLORIDE SERPL-SCNC: 100 MMOL/L
CO2 SERPL-SCNC: 23 MMOL/L
CREAT SERPL-MCNC: 1.4 MG/DL
GLUCOSE SERPL-MCNC: 107 MG/DL
HBA1C MFR BLD HPLC: 5.5 %
HCV AB SER QL: NONREACTIVE
HCV S/CO RATIO: 0.16 S/CO
HIV1+2 AB SPEC QL IA.RAPID: NONREACTIVE
HSV 1+2 IGG SER IA-IMP: POSITIVE
HSV 1+2 IGG SER IA-IMP: POSITIVE
HSV1 IGG SER QL: 31.5 INDEX
HSV2 IGG SER QL: 3.05 INDEX
N GONORRHOEA RRNA SPEC QL NAA+PROBE: NOT DETECTED
POTASSIUM SERPL-SCNC: 4.1 MMOL/L
PROT SERPL-MCNC: 8.1 G/DL
SODIUM SERPL-SCNC: 141 MMOL/L
SOURCE AMPLIFICATION: NORMAL
TSH SERPL-ACNC: 3.75 UIU/ML

## 2018-12-06 ENCOUNTER — RX RENEWAL (OUTPATIENT)
Age: 53
End: 2018-12-06

## 2019-01-30 ENCOUNTER — LABORATORY RESULT (OUTPATIENT)
Age: 54
End: 2019-01-30

## 2019-01-30 ENCOUNTER — APPOINTMENT (OUTPATIENT)
Dept: INTERNAL MEDICINE | Facility: CLINIC | Age: 54
End: 2019-01-30
Payer: MEDICAID

## 2019-01-30 VITALS
OXYGEN SATURATION: 98 % | HEART RATE: 101 BPM | WEIGHT: 229 LBS | HEIGHT: 72 IN | SYSTOLIC BLOOD PRESSURE: 138 MMHG | BODY MASS INDEX: 31.02 KG/M2 | TEMPERATURE: 98.2 F | DIASTOLIC BLOOD PRESSURE: 92 MMHG

## 2019-01-30 PROCEDURE — 99396 PREV VISIT EST AGE 40-64: CPT | Mod: 25

## 2019-01-30 PROCEDURE — 36415 COLL VENOUS BLD VENIPUNCTURE: CPT

## 2019-01-30 PROCEDURE — 93000 ELECTROCARDIOGRAM COMPLETE: CPT

## 2019-01-30 PROCEDURE — 99214 OFFICE O/P EST MOD 30 MIN: CPT | Mod: 25

## 2019-01-31 LAB
ALBUMIN SERPL ELPH-MCNC: 4.8 G/DL
ALP BLD-CCNC: 66 U/L
ALT SERPL-CCNC: 61 U/L
ANION GAP SERPL CALC-SCNC: 18 MMOL/L
AST SERPL-CCNC: 44 U/L
BILIRUB SERPL-MCNC: 0.5 MG/DL
BUN SERPL-MCNC: 19 MG/DL
C TRACH RRNA SPEC QL NAA+PROBE: NOT DETECTED
CALCIUM SERPL-MCNC: 10.1 MG/DL
CHLORIDE SERPL-SCNC: 102 MMOL/L
CHOLEST SERPL-MCNC: 147 MG/DL
CHOLEST/HDLC SERPL: 4.3 RATIO
CO2 SERPL-SCNC: 20 MMOL/L
CREAT SERPL-MCNC: 1.24 MG/DL
GLUCOSE SERPL-MCNC: 82 MG/DL
HBA1C MFR BLD HPLC: 5.5 %
HCV AB SER QL: NONREACTIVE
HCV S/CO RATIO: 0.11 S/CO
HDLC SERPL-MCNC: 34 MG/DL
HIV1+2 AB SPEC QL IA.RAPID: NONREACTIVE
HSV 1+2 IGG SER IA-IMP: POSITIVE
HSV 1+2 IGG SER IA-IMP: POSITIVE
HSV1 IGG SER QL: 35.1 INDEX
HSV2 IGG SER QL: 2.42 INDEX
LDLC SERPL CALC-MCNC: 59 MG/DL
N GONORRHOEA RRNA SPEC QL NAA+PROBE: NOT DETECTED
POTASSIUM SERPL-SCNC: 3.8 MMOL/L
PROT SERPL-MCNC: 8.1 G/DL
PSA SERPL-MCNC: 0.79 NG/ML
SODIUM SERPL-SCNC: 141 MMOL/L
SOURCE AMPLIFICATION: NORMAL
TRIGL SERPL-MCNC: 268 MG/DL
TSH SERPL-ACNC: 4 UIU/ML

## 2019-02-05 ENCOUNTER — APPOINTMENT (OUTPATIENT)
Dept: NEPHROLOGY | Facility: CLINIC | Age: 54
End: 2019-02-05
Payer: MEDICAID

## 2019-02-05 VITALS — WEIGHT: 228 LBS | BODY MASS INDEX: 30.92 KG/M2

## 2019-02-05 PROCEDURE — 99214 OFFICE O/P EST MOD 30 MIN: CPT

## 2019-02-05 NOTE — HISTORY OF PRESENT ILLNESS
[FreeTextEntry1] : • CKD stable, creatinine 1.1 No proteinuria or hematuria. . •  HTN now controlled. No lightheadedness. *  Borderline DM improved.. * Truvada, HCTZ, and losartan require monitoring. * No symptoms of kidney stones.

## 2019-02-05 NOTE — PHYSICAL EXAM
[General Appearance - Alert] : alert [General Appearance - In No Acute Distress] : in no acute distress [Sclera] : the sclera and conjunctiva were normal [PERRL With Normal Accommodation] : pupils were equal in size, round, and reactive to light [Extraocular Movements] : extraocular movements were intact [Neck Appearance] : the appearance of the neck was normal [Neck Cervical Mass (___cm)] : no neck mass was observed [Jugular Venous Distention Increased] : there was no jugular-venous distention [Thyroid Diffuse Enlargement] : the thyroid was not enlarged [Thyroid Nodule] : there were no palpable thyroid nodules [Auscultation Breath Sounds / Voice Sounds] : lungs were clear to auscultation bilaterally [Heart Rate And Rhythm] : heart rate was normal and rhythm regular [Heart Sounds] : normal S1 and S2 [Heart Sounds Gallop] : no gallops [Murmurs] : no murmurs [Heart Sounds Pericardial Friction Rub] : no pericardial rub [Edema] : there was no peripheral edema [Veins - Varicosity Changes] : there were no varicosital changes [Bowel Sounds] : normal bowel sounds [Abdomen Soft] : soft [Abdomen Tenderness] : non-tender [Abdomen Mass (___ Cm)] : no abdominal mass palpated [Cervical Lymph Nodes Enlarged Posterior Bilaterally] : posterior cervical [Cervical Lymph Nodes Enlarged Anterior Bilaterally] : anterior cervical [Supraclavicular Lymph Nodes Enlarged Bilaterally] : supraclavicular [Abnormal Walk] : normal gait [Nail Clubbing] : no clubbing  or cyanosis of the fingernails [Musculoskeletal - Swelling] : no joint swelling seen [Motor Tone] : muscle strength and tone were normal [Skin Color & Pigmentation] : normal skin color and pigmentation [Skin Turgor] : normal skin turgor [] : no rash [Oriented To Time, Place, And Person] : oriented to person, place, and time [Impaired Insight] : insight and judgment were intact [Affect] : the affect was normal

## 2019-02-05 NOTE — ASSESSMENT
[FreeTextEntry1] : # CKD stage 3 stable.\par * The patient has been counseled that chronic kidney disease is a significant condition and regular office followup with me (at least every 4 months for now) is essential for monitoring, and that it is their responsibility to make a follow up appointment. The patient has been counseled never to stop taking their medications without discussing it with me or another doctor. The patient has been counseled on risk of acute renal failure and instructed to immediately call and speak with me or go immediately to ER with any severe symptoms, nausea, vomiting, diarrhea, chest pain, or shortness of breath. The patient has been counseled on avoiding NSAIDs. Reducing or avoiding red meat and starting folate 800 mg qd has been advised. A counseling information sheet has been given. All their questions were answered.\par \par # HTN borderline uncontrolled.\par * The patient's blood pressure was checked with the [employer] [id number]-[id number] using the SPRINT trial protocol after sitting quietly in an empty room with back supported and feet on the floor for 5 minutes. The average of 3 readings were taken. \par * The patient was advised to check their BP at home with an automatic arm cuff, write down the readings, and reach me directly on the phone immediately if they are persistently > 160 systolic or if SBP is less than 100 or if lightheadedness develops. They were advised to bring in all blood pressure readings and medications next visit.\par * The patient has been counseled that they have a a significant medical condition and regular office followup (at least every 4 months for now) is essential for monitoring, and that it is their responsibility to make follow up appointments. The patient also understands that they must never stop or change their medications without discussing this with me (or another physician). A counseling information sheet has been given. All their questions were answered. \par \par # Drug therapy.\par * Advised that truvada, llosartan, and HCTZ requires monitoring and he must follow up for lab tests.\par \par # Impaired glucose tolerance.\par * Follow up HGA1.

## 2019-02-08 LAB — T PALLIDUM AB SER QL IA: POSITIVE

## 2019-04-30 ENCOUNTER — LABORATORY RESULT (OUTPATIENT)
Age: 54
End: 2019-04-30

## 2019-04-30 ENCOUNTER — APPOINTMENT (OUTPATIENT)
Dept: INTERNAL MEDICINE | Facility: CLINIC | Age: 54
End: 2019-04-30
Payer: MEDICAID

## 2019-04-30 VITALS
HEART RATE: 93 BPM | SYSTOLIC BLOOD PRESSURE: 146 MMHG | DIASTOLIC BLOOD PRESSURE: 95 MMHG | OXYGEN SATURATION: 95 % | WEIGHT: 231 LBS | BODY MASS INDEX: 31.29 KG/M2 | HEIGHT: 72 IN | TEMPERATURE: 98.1 F

## 2019-04-30 PROCEDURE — 99214 OFFICE O/P EST MOD 30 MIN: CPT | Mod: 25

## 2019-04-30 PROCEDURE — G0444 DEPRESSION SCREEN ANNUAL: CPT

## 2019-04-30 PROCEDURE — G0442 ANNUAL ALCOHOL SCREEN 15 MIN: CPT | Mod: 59

## 2019-04-30 PROCEDURE — 36415 COLL VENOUS BLD VENIPUNCTURE: CPT

## 2019-05-01 LAB
ALBUMIN SERPL ELPH-MCNC: 5 G/DL
ALP BLD-CCNC: 70 U/L
ALT SERPL-CCNC: 43 U/L
ANION GAP SERPL CALC-SCNC: 13 MMOL/L
AST SERPL-CCNC: 36 U/L
BILIRUB SERPL-MCNC: 0.7 MG/DL
BUN SERPL-MCNC: 20 MG/DL
C TRACH RRNA SPEC QL NAA+PROBE: NOT DETECTED
CALCIUM SERPL-MCNC: 10.1 MG/DL
CHLORIDE SERPL-SCNC: 104 MMOL/L
CO2 SERPL-SCNC: 23 MMOL/L
CREAT SERPL-MCNC: 1.23 MG/DL
ESTIMATED AVERAGE GLUCOSE: 114 MG/DL
GLUCOSE SERPL-MCNC: 101 MG/DL
HBA1C MFR BLD HPLC: 5.6 %
HCV AB SER QL: NONREACTIVE
HCV S/CO RATIO: 0.12 S/CO
HIV1+2 AB SPEC QL IA.RAPID: NONREACTIVE
N GONORRHOEA RRNA SPEC QL NAA+PROBE: NOT DETECTED
POTASSIUM SERPL-SCNC: 4 MMOL/L
PROT SERPL-MCNC: 7.6 G/DL
SODIUM SERPL-SCNC: 140 MMOL/L
SOURCE AMPLIFICATION: NORMAL

## 2019-05-01 NOTE — ASSESSMENT
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

## 2019-05-01 NOTE — HEALTH RISK ASSESSMENT
[0] : 2) Feeling down, depressed, or hopeless: Not at all (0) [FreeTextEntry1] : none recently, but over winter [[id number]] : 0

## 2019-05-01 NOTE — HISTORY OF PRESENT ILLNESS
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 Also, he's concerned about low level depression which was significant over the winter, is better now; he'd like to try a medicine.  Has never before. Meds reviewed. No suicidal ideation.  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 Father ailing after CVA, in [place]. \par \par

## 2019-05-02 ENCOUNTER — TRANSCRIPTION ENCOUNTER (OUTPATIENT)
Age: 54
End: 2019-05-02

## 2019-05-03 LAB
HSV 1+2 IGG SER IA-IMP: POSITIVE
HSV 1+2 IGG SER IA-IMP: POSITIVE
HSV1 IGG SER QL: 26.7 INDEX
HSV2 IGG SER QL: 2.15 INDEX

## 2019-05-07 ENCOUNTER — APPOINTMENT (OUTPATIENT)
Dept: NEPHROLOGY | Facility: CLINIC | Age: 54
End: 2019-05-07
Payer: MEDICAID

## 2019-05-07 VITALS — HEART RATE: 79 BPM | DIASTOLIC BLOOD PRESSURE: 72 MMHG | SYSTOLIC BLOOD PRESSURE: 114 MMHG

## 2019-05-07 VITALS
OXYGEN SATURATION: 98 % | HEIGHT: 72 IN | BODY MASS INDEX: 31.29 KG/M2 | TEMPERATURE: 98.1 F | HEART RATE: 78 BPM | WEIGHT: 231 LBS

## 2019-05-07 PROCEDURE — 99214 OFFICE O/P EST MOD 30 MIN: CPT

## 2019-05-07 NOTE — PHYSICAL EXAM
[General Appearance - Alert] : alert [General Appearance - In No Acute Distress] : in no acute distress [Sclera] : the sclera and conjunctiva were normal [PERRL With Normal Accommodation] : pupils were equal in size, round, and reactive to light [Neck Appearance] : the appearance of the neck was normal [Extraocular Movements] : extraocular movements were intact [Jugular Venous Distention Increased] : there was no jugular-venous distention [Neck Cervical Mass (___cm)] : no neck mass was observed [Thyroid Nodule] : there were no palpable thyroid nodules [Thyroid Diffuse Enlargement] : the thyroid was not enlarged [Auscultation Breath Sounds / Voice Sounds] : lungs were clear to auscultation bilaterally [Heart Rate And Rhythm] : heart rate was normal and rhythm regular [Heart Sounds Gallop] : no gallops [Heart Sounds] : normal S1 and S2 [Murmurs] : no murmurs [Heart Sounds Pericardial Friction Rub] : no pericardial rub [Edema] : there was no peripheral edema [Bowel Sounds] : normal bowel sounds [Veins - Varicosity Changes] : there were no varicosital changes [Abdomen Tenderness] : non-tender [Abdomen Soft] : soft [Abdomen Mass (___ Cm)] : no abdominal mass palpated [Supraclavicular Lymph Nodes Enlarged Bilaterally] : supraclavicular [Cervical Lymph Nodes Enlarged Anterior Bilaterally] : anterior cervical [Cervical Lymph Nodes Enlarged Posterior Bilaterally] : posterior cervical [Abnormal Walk] : normal gait [Nail Clubbing] : no clubbing  or cyanosis of the fingernails [Musculoskeletal - Swelling] : no joint swelling seen [Motor Tone] : muscle strength and tone were normal [Skin Color & Pigmentation] : normal skin color and pigmentation [Skin Turgor] : normal skin turgor [] : no rash [Oriented To Time, Place, And Person] : oriented to person, place, and time [Impaired Insight] : insight and judgment were intact [Affect] : the affect was normal

## 2019-05-07 NOTE — ASSESSMENT
[FreeTextEntry1] : # CKD stage 3 stable.\par * The patient has been counseled that chronic kidney disease is a significant condition and regular office followup with me (at least every 4 months for now) is essential for monitoring, and that it is their responsibility to make a follow up appointment.\par * The patient has been counseled never to stop taking their medications without discussing it with me or another doctor.\par * The patient has been counseled on risk of acute renal failure and instructed to immediately call and speak with me or go immediately to ER with any severe symptoms, nausea, vomiting, diarrhea, chest pain, or shortness of breath.\par * The patient has been counseled on avoiding NSAIDs.\par * Reducing or avoiding red meat, following a relatively low oxalate diet, and starting folate 800 mg qd has been advised.\par * A counseling information sheet has been given and they were provided sufficient time to review this. All their questions were answered.\par \par # HTN borderline.\par * The patient's blood pressure was checked with the [employer] [id number] using the SPRINT trial protocol after sitting quietly in an empty room with arm supported, back supported, and feet on the floor for 5 minutes. The average of 3 readings were taken. \par * The patient has been advised to check their BP at home with an automatic arm cuff, write down the readings, and reach me directly on the phone immediately if they are persistently > 180 systolic or if SBP is less than 100 or if lightheadedness develops. They were advised to bring in all blood pressure readings and medications next visit.\par * The patient has been counseled that they have a a significant medical condition and regular office followup (at least every 4 months for now) is essential for monitoring, and that it is their responsibility to make follow up appointments.\par * The patient also has been counseled that they must never stop or change any medications without discussing this with me (or another physician). \par * A counseling information sheet has been given and they were provided sufficient time to review this sheet. All their questions were answered.\par \par # Impaired glucose tolerance.\par * Follow up HGA1c.

## 2019-05-07 NOTE — HISTORY OF PRESENT ILLNESS
[FreeTextEntry1] : • CKD stable, creatinine 1.2. No proteinuria or hematuria. •  HTN now controlled. No lightheadedness. *  Borderline DM improved. /92 standing in office. * Truvada, HCTZ, and losartan require monitoring. * No symptoms of kidney stones. * Borderline DM, HGA1c 5.6 (now improved).

## 2019-05-08 LAB — T PALLIDUM AB SER QL IA: POSITIVE

## 2019-08-07 ENCOUNTER — APPOINTMENT (OUTPATIENT)
Dept: INTERNAL MEDICINE | Facility: CLINIC | Age: 54
End: 2019-08-07
Payer: MEDICAID

## 2019-08-07 VITALS
OXYGEN SATURATION: 98 % | HEART RATE: 77 BPM | SYSTOLIC BLOOD PRESSURE: 112 MMHG | DIASTOLIC BLOOD PRESSURE: 78 MMHG | HEIGHT: 72 IN | BODY MASS INDEX: 32.1 KG/M2 | TEMPERATURE: 98.6 F | WEIGHT: 237 LBS

## 2019-08-07 PROCEDURE — 36415 COLL VENOUS BLD VENIPUNCTURE: CPT

## 2019-08-07 PROCEDURE — 99214 OFFICE O/P EST MOD 30 MIN: CPT | Mod: 25

## 2019-08-07 NOTE — PHYSICAL EXAM
[No Acute Distress] : no acute distress [Well Nourished] : well nourished [Well Developed] : well developed [Well-Appearing] : well-appearing [Normal Sclera/Conjunctiva] : normal sclera/conjunctiva [PERRL] : pupils equal round and reactive to light [EOMI] : extraocular movements intact [Normal Oropharynx] : the oropharynx was normal [Normal Outer Ear/Nose] : the outer ears and nose were normal in appearance [No Lymphadenopathy] : no lymphadenopathy [No JVD] : no jugular venous distention [Supple] : supple [Thyroid Normal, No Nodules] : the thyroid was normal and there were no nodules present [No Respiratory Distress] : no respiratory distress  [No Accessory Muscle Use] : no accessory muscle use [Normal Rate] : normal rate  [Clear to Auscultation] : lungs were clear to auscultation bilaterally [Regular Rhythm] : with a regular rhythm [Normal S1, S2] : normal S1 and S2 [No Murmur] : no murmur heard [No Carotid Bruits] : no carotid bruits [No Abdominal Bruit] : a ~M bruit was not heard ~T in the abdomen [No Varicosities] : no varicosities [Pedal Pulses Present] : the pedal pulses are present [No Palpable Aorta] : no palpable aorta [No Edema] : there was no peripheral edema [No Extremity Clubbing/Cyanosis] : no extremity clubbing/cyanosis [Soft] : abdomen soft [Non Tender] : non-tender [No Masses] : no abdominal mass palpated [Non-distended] : non-distended [No HSM] : no HSM [Normal Bowel Sounds] : normal bowel sounds [Normal Posterior Cervical Nodes] : no posterior cervical lymphadenopathy [Normal Anterior Cervical Nodes] : no anterior cervical lymphadenopathy [No CVA Tenderness] : no CVA  tenderness [No Spinal Tenderness] : no spinal tenderness [No Joint Swelling] : no joint swelling [Grossly Normal Strength/Tone] : grossly normal strength/tone [No Rash] : no rash [Coordination Grossly Intact] : coordination grossly intact [No Focal Deficits] : no focal deficits [Normal Gait] : normal gait [Deep Tendon Reflexes (DTR)] : deep tendon reflexes were 2+ and symmetric [Normal Affect] : the affect was normal [Normal Insight/Judgement] : insight and judgment were intact

## 2019-08-08 LAB
ALBUMIN SERPL ELPH-MCNC: 5.1 G/DL
ALP BLD-CCNC: 55 U/L
ALT SERPL-CCNC: 41 U/L
ANION GAP SERPL CALC-SCNC: 13 MMOL/L
AST SERPL-CCNC: 35 U/L
BILIRUB SERPL-MCNC: 0.8 MG/DL
BUN SERPL-MCNC: 14 MG/DL
CALCIUM SERPL-MCNC: 9.8 MG/DL
CHLORIDE SERPL-SCNC: 102 MMOL/L
CO2 SERPL-SCNC: 26 MMOL/L
CREAT SERPL-MCNC: 1.26 MG/DL
GLUCOSE SERPL-MCNC: 103 MG/DL
HCV AB SER QL: NONREACTIVE
HCV S/CO RATIO: 0.18 S/CO
HIV1+2 AB SPEC QL IA.RAPID: NONREACTIVE
HSV 1+2 IGG SER IA-IMP: POSITIVE
HSV 1+2 IGG SER IA-IMP: POSITIVE
HSV1 IGG SER QL: 34.2 INDEX
HSV2 IGG SER QL: 2.04 INDEX
POTASSIUM SERPL-SCNC: 4 MMOL/L
PROT SERPL-MCNC: 7.9 G/DL
SODIUM SERPL-SCNC: 141 MMOL/L

## 2019-10-09 ENCOUNTER — LABORATORY RESULT (OUTPATIENT)
Age: 54
End: 2019-10-09

## 2019-10-09 ENCOUNTER — APPOINTMENT (OUTPATIENT)
Dept: NEPHROLOGY | Facility: CLINIC | Age: 54
End: 2019-10-09
Payer: MEDICAID

## 2019-10-09 VITALS — SYSTOLIC BLOOD PRESSURE: 122 MMHG | DIASTOLIC BLOOD PRESSURE: 77 MMHG | HEART RATE: 80 BPM

## 2019-10-09 PROCEDURE — 99214 OFFICE O/P EST MOD 30 MIN: CPT

## 2019-10-09 RX ORDER — ATORVASTATIN CALCIUM 80 MG/1
TABLET, FILM COATED ORAL
Refills: 0 | Status: DISCONTINUED | COMMUNITY
End: 2019-10-09

## 2019-10-09 RX ORDER — SERTRALINE HYDROCHLORIDE 50 MG/1
50 TABLET, FILM COATED ORAL
Qty: 90 | Refills: 3 | Status: DISCONTINUED | COMMUNITY
Start: 2019-04-30 | End: 2019-10-09

## 2019-10-09 NOTE — ASSESSMENT
[FreeTextEntry1] : # CKD stage 2 - 3.\par * The patient has been counseled that chronic kidney disease is a significant condition and regular office followup with me (at least every 4 months for now) is essential for monitoring, and that it is their responsibility to make a follow up appointment.\par * The patient has been counseled never to stop taking their medications without discussing it with me or another doctor.\par * The patient has been counseled on risk of acute renal failure and instructed to immediately call and speak with me or go immediately to ER with any severe symptoms, nausea, vomiting, diarrhea, chest pain, or shortness of breath.\par * The patient has been counseled on avoiding NSAIDs.\par * Reducing or avoiding red meat, following a relatively low oxalate diet, and starting folate 800 mg qd has been advised.\par * A counseling information sheet has been given and they were provided sufficient time to review this. All their questions were answered.\par \par # HTN controlled.\par * The patient's blood pressure was checked with the [employer] [id number]-[id number] using the SPRINT trial protocol after sitting quietly in an empty room with arm supported, back supported, and feet on the floor for 5 minutes. The average of 3 readings were taken. \par * The patient has been advised to check their BP at home with an automatic arm cuff, write down the readings, and reach me directly on the phone immediately if they are persistently > 180 systolic or if SBP is less than 100 or if lightheadedness develops. They were advised to bring in all blood pressure readings and medications next visit.\par * The patient has been counseled that they have a a significant medical condition and regular office followup (at least every 4 months for now) is essential for monitoring, and that it is their responsibility to make follow up appointments.\par * The patient also has been counseled that they must never stop or change any medications without discussing this with me (or another physician). \par * A counseling information sheet has been given and they were provided sufficient time to review this sheet. All their questions were answered.\par \par # Borderline glucose tolerance.\par * Follow up HGA1c.

## 2019-10-09 NOTE — PHYSICAL EXAM
[General Appearance - Alert] : alert [General Appearance - In No Acute Distress] : in no acute distress [PERRL With Normal Accommodation] : pupils were equal in size, round, and reactive to light [Sclera] : the sclera and conjunctiva were normal [Extraocular Movements] : extraocular movements were intact [Neck Appearance] : the appearance of the neck was normal [Neck Cervical Mass (___cm)] : no neck mass was observed [Jugular Venous Distention Increased] : there was no jugular-venous distention [Thyroid Diffuse Enlargement] : the thyroid was not enlarged [Thyroid Nodule] : there were no palpable thyroid nodules [Auscultation Breath Sounds / Voice Sounds] : lungs were clear to auscultation bilaterally [Heart Sounds] : normal S1 and S2 [Heart Rate And Rhythm] : heart rate was normal and rhythm regular [Heart Sounds Pericardial Friction Rub] : no pericardial rub [Heart Sounds Gallop] : no gallops [Murmurs] : no murmurs [Veins - Varicosity Changes] : there were no varicosital changes [Edema] : there was no peripheral edema [Abdomen Soft] : soft [Bowel Sounds] : normal bowel sounds [Abdomen Tenderness] : non-tender [Abdomen Mass (___ Cm)] : no abdominal mass palpated [Cervical Lymph Nodes Enlarged Posterior Bilaterally] : posterior cervical [Supraclavicular Lymph Nodes Enlarged Bilaterally] : supraclavicular [Cervical Lymph Nodes Enlarged Anterior Bilaterally] : anterior cervical [Abnormal Walk] : normal gait [Nail Clubbing] : no clubbing  or cyanosis of the fingernails [Musculoskeletal - Swelling] : no joint swelling seen [Motor Tone] : muscle strength and tone were normal [Skin Turgor] : normal skin turgor [Skin Color & Pigmentation] : normal skin color and pigmentation [] : no rash [Oriented To Time, Place, And Person] : oriented to person, place, and time [Impaired Insight] : insight and judgment were intact [Affect] : the affect was normal

## 2019-10-12 LAB
APPEARANCE: ABNORMAL
BILIRUBIN URINE: NEGATIVE
BLOOD URINE: NEGATIVE
COLOR: NORMAL
CREAT SPEC-SCNC: 105 MG/DL
CREAT/PROT UR: 0.1 RATIO
GLUCOSE QUALITATIVE U: NEGATIVE
KETONES URINE: NEGATIVE
LEUKOCYTE ESTERASE URINE: NEGATIVE
NITRITE URINE: NEGATIVE
PH URINE: 5.5
PROT UR-MCNC: 5 MG/DL
PROTEIN URINE: NEGATIVE
SPECIFIC GRAVITY URINE: 1.01
UROBILINOGEN URINE: NORMAL

## 2019-11-13 ENCOUNTER — APPOINTMENT (OUTPATIENT)
Dept: INTERNAL MEDICINE | Facility: CLINIC | Age: 54
End: 2019-11-13
Payer: MEDICAID

## 2019-11-13 ENCOUNTER — LABORATORY RESULT (OUTPATIENT)
Age: 54
End: 2019-11-13

## 2019-11-13 VITALS
SYSTOLIC BLOOD PRESSURE: 121 MMHG | BODY MASS INDEX: 32.78 KG/M2 | TEMPERATURE: 98.3 F | HEIGHT: 72 IN | WEIGHT: 242 LBS | DIASTOLIC BLOOD PRESSURE: 88 MMHG | OXYGEN SATURATION: 98 % | HEART RATE: 92 BPM

## 2019-11-13 PROCEDURE — 90686 IIV4 VACC NO PRSV 0.5 ML IM: CPT

## 2019-11-13 PROCEDURE — 99214 OFFICE O/P EST MOD 30 MIN: CPT | Mod: 25

## 2019-11-13 PROCEDURE — G0008: CPT

## 2019-11-13 PROCEDURE — 36415 COLL VENOUS BLD VENIPUNCTURE: CPT

## 2019-11-13 NOTE — PHYSICAL EXAM
[Well Nourished] : well nourished [No Acute Distress] : no acute distress [Well Developed] : well developed [Normal Sclera/Conjunctiva] : normal sclera/conjunctiva [Well-Appearing] : well-appearing [EOMI] : extraocular movements intact [PERRL] : pupils equal round and reactive to light [Normal Outer Ear/Nose] : the outer ears and nose were normal in appearance [No JVD] : no jugular venous distention [Normal Oropharynx] : the oropharynx was normal [Supple] : supple [Thyroid Normal, No Nodules] : the thyroid was normal and there were no nodules present [No Lymphadenopathy] : no lymphadenopathy [No Accessory Muscle Use] : no accessory muscle use [No Respiratory Distress] : no respiratory distress  [Clear to Auscultation] : lungs were clear to auscultation bilaterally [Regular Rhythm] : with a regular rhythm [Normal Rate] : normal rate  [No Murmur] : no murmur heard [No Carotid Bruits] : no carotid bruits [Normal S1, S2] : normal S1 and S2 [No Abdominal Bruit] : a ~M bruit was not heard ~T in the abdomen [No Varicosities] : no varicosities [Pedal Pulses Present] : the pedal pulses are present [No Edema] : there was no peripheral edema [No Palpable Aorta] : no palpable aorta [No Extremity Clubbing/Cyanosis] : no extremity clubbing/cyanosis [Non Tender] : non-tender [Soft] : abdomen soft [Non-distended] : non-distended [No HSM] : no HSM [No Masses] : no abdominal mass palpated [Normal Posterior Cervical Nodes] : no posterior cervical lymphadenopathy [Normal Bowel Sounds] : normal bowel sounds [No CVA Tenderness] : no CVA  tenderness [Normal Anterior Cervical Nodes] : no anterior cervical lymphadenopathy [No Joint Swelling] : no joint swelling [No Spinal Tenderness] : no spinal tenderness [Coordination Grossly Intact] : coordination grossly intact [No Rash] : no rash [Grossly Normal Strength/Tone] : grossly normal strength/tone [No Focal Deficits] : no focal deficits [Normal Gait] : normal gait [Deep Tendon Reflexes (DTR)] : deep tendon reflexes were 2+ and symmetric [Normal Affect] : the affect was normal [Normal Insight/Judgement] : insight and judgment were intact

## 2019-11-14 LAB
ALBUMIN SERPL ELPH-MCNC: 5.2 G/DL
ALP BLD-CCNC: 56 U/L
ALT SERPL-CCNC: 37 U/L
ANION GAP SERPL CALC-SCNC: 18 MMOL/L
AST SERPL-CCNC: 36 U/L
BASOPHILS # BLD AUTO: 0.03 K/UL
BASOPHILS NFR BLD AUTO: 0.6 %
BILIRUB SERPL-MCNC: 0.8 MG/DL
BUN SERPL-MCNC: 19 MG/DL
CALCIUM SERPL-MCNC: 9.9 MG/DL
CHLORIDE SERPL-SCNC: 103 MMOL/L
CO2 SERPL-SCNC: 19 MMOL/L
CREAT SERPL-MCNC: 1.47 MG/DL
EOSINOPHIL # BLD AUTO: 0.5 K/UL
EOSINOPHIL NFR BLD AUTO: 10.6 %
ESTIMATED AVERAGE GLUCOSE: 114 MG/DL
GLUCOSE SERPL-MCNC: 88 MG/DL
HBA1C MFR BLD HPLC: 5.6 %
HCT VFR BLD CALC: 46.7 %
HGB BLD-MCNC: 15.6 G/DL
HIV1+2 AB SPEC QL IA.RAPID: NONREACTIVE
IMM GRANULOCYTES NFR BLD AUTO: 0.2 %
LYMPHOCYTES # BLD AUTO: 1.71 K/UL
LYMPHOCYTES NFR BLD AUTO: 36.3 %
MAN DIFF?: NORMAL
MCHC RBC-ENTMCNC: 28.4 PG
MCHC RBC-ENTMCNC: 33.4 GM/DL
MCV RBC AUTO: 84.9 FL
MONOCYTES # BLD AUTO: 0.27 K/UL
MONOCYTES NFR BLD AUTO: 5.7 %
NEUTROPHILS # BLD AUTO: 2.19 K/UL
NEUTROPHILS NFR BLD AUTO: 46.6 %
PLATELET # BLD AUTO: 190 K/UL
POTASSIUM SERPL-SCNC: 4.3 MMOL/L
PROT SERPL-MCNC: 7.8 G/DL
RBC # BLD: 5.5 M/UL
RBC # FLD: 12.8 %
SODIUM SERPL-SCNC: 140 MMOL/L
WBC # FLD AUTO: 4.71 K/UL

## 2019-11-15 ENCOUNTER — MED ADMIN CHARGE (OUTPATIENT)
Age: 54
End: 2019-11-15

## 2019-11-15 LAB
C TRACH RRNA SPEC QL NAA+PROBE: NOT DETECTED
HCV AB SER QL: NONREACTIVE
HCV S/CO RATIO: 0.18 S/CO
N GONORRHOEA RRNA SPEC QL NAA+PROBE: NOT DETECTED
SOURCE AMPLIFICATION: NORMAL

## 2019-11-15 NOTE — HISTORY OF PRESENT ILLNESS
[de-identified] : 54 yr old w HTN, HLD, CRI here for labs on Truvada. Feels well. 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

## 2019-11-16 LAB — T PALLIDUM AB SER QL IA: POSITIVE

## 2019-12-30 ENCOUNTER — OTHER (OUTPATIENT)
Age: 54
End: 2019-12-30

## 2019-12-30 RX ORDER — OSELTAMIVIR PHOSPHATE 75 MG/1
75 CAPSULE ORAL TWICE DAILY
Qty: 10 | Refills: 0 | Status: DISCONTINUED | COMMUNITY
Start: 2019-01-30 | End: 2019-12-30

## 2020-02-14 ENCOUNTER — RX RENEWAL (OUTPATIENT)
Age: 55
End: 2020-02-14

## 2020-02-17 ENCOUNTER — LABORATORY RESULT (OUTPATIENT)
Age: 55
End: 2020-02-17

## 2020-02-18 ENCOUNTER — NON-APPOINTMENT (OUTPATIENT)
Age: 55
End: 2020-02-18

## 2020-02-18 ENCOUNTER — APPOINTMENT (OUTPATIENT)
Dept: INTERNAL MEDICINE | Facility: CLINIC | Age: 55
End: 2020-02-18
Payer: MEDICAID

## 2020-02-18 VITALS
BODY MASS INDEX: 32.64 KG/M2 | DIASTOLIC BLOOD PRESSURE: 88 MMHG | HEART RATE: 92 BPM | TEMPERATURE: 98 F | HEIGHT: 72 IN | OXYGEN SATURATION: 97 % | WEIGHT: 241 LBS | SYSTOLIC BLOOD PRESSURE: 122 MMHG

## 2020-02-18 DIAGNOSIS — A74.9 CHLAMYDIAL INFECTION, UNSPECIFIED: ICD-10-CM

## 2020-02-18 DIAGNOSIS — Z87.09 PERSONAL HISTORY OF OTHER DISEASES OF THE RESPIRATORY SYSTEM: ICD-10-CM

## 2020-02-18 DIAGNOSIS — Z87.19 PERSONAL HISTORY OF OTHER DISEASES OF THE DIGESTIVE SYSTEM: ICD-10-CM

## 2020-02-18 DIAGNOSIS — Z87.2 PERSONAL HISTORY OF DISEASES OF THE SKIN AND SUBCUTANEOUS TISSUE: ICD-10-CM

## 2020-02-18 DIAGNOSIS — M79.604 PAIN IN RIGHT LEG: ICD-10-CM

## 2020-02-18 DIAGNOSIS — K13.0 DISEASES OF LIPS: ICD-10-CM

## 2020-02-18 DIAGNOSIS — M75.111 INCOMPLETE ROTATOR CUFF TEAR OR RUPTURE OF RIGHT SHOULDER, NOT SPECIFIED AS TRAUMATIC: ICD-10-CM

## 2020-02-18 PROCEDURE — 93000 ELECTROCARDIOGRAM COMPLETE: CPT

## 2020-02-18 PROCEDURE — 99396 PREV VISIT EST AGE 40-64: CPT | Mod: 25

## 2020-02-18 PROCEDURE — 36415 COLL VENOUS BLD VENIPUNCTURE: CPT

## 2020-02-19 LAB
ALBUMIN SERPL ELPH-MCNC: 5.1 G/DL
ALP BLD-CCNC: 62 U/L
ALT SERPL-CCNC: 43 U/L
ANION GAP SERPL CALC-SCNC: 16 MMOL/L
APPEARANCE: CLEAR
AST SERPL-CCNC: 36 U/L
BACTERIA: NEGATIVE
BASOPHILS # BLD AUTO: 0.04 K/UL
BASOPHILS NFR BLD AUTO: 0.8 %
BILIRUB SERPL-MCNC: 0.6 MG/DL
BILIRUBIN URINE: NEGATIVE
BLOOD URINE: NEGATIVE
BUN SERPL-MCNC: 17 MG/DL
CALCIUM SERPL-MCNC: 10.1 MG/DL
CHLORIDE SERPL-SCNC: 102 MMOL/L
CHOLEST SERPL-MCNC: 157 MG/DL
CHOLEST/HDLC SERPL: 4.2 RATIO
CO2 SERPL-SCNC: 23 MMOL/L
COLOR: NORMAL
CREAT SERPL-MCNC: 1.48 MG/DL
EOSINOPHIL # BLD AUTO: 0.1 K/UL
EOSINOPHIL NFR BLD AUTO: 2.1 %
GLUCOSE QUALITATIVE U: NEGATIVE
GLUCOSE SERPL-MCNC: 109 MG/DL
HCT VFR BLD CALC: 49 %
HCV AB SER QL: NONREACTIVE
HCV S/CO RATIO: 0.2 S/CO
HDLC SERPL-MCNC: 37 MG/DL
HGB BLD-MCNC: 15.9 G/DL
HIV1+2 AB SPEC QL IA.RAPID: NONREACTIVE
HYALINE CASTS: 0 /LPF
IMM GRANULOCYTES NFR BLD AUTO: 0.2 %
KETONES URINE: NEGATIVE
LDLC SERPL CALC-MCNC: 90 MG/DL
LEUKOCYTE ESTERASE URINE: NEGATIVE
LYMPHOCYTES # BLD AUTO: 2.32 K/UL
LYMPHOCYTES NFR BLD AUTO: 49.2 %
MAN DIFF?: NORMAL
MCHC RBC-ENTMCNC: 28.7 PG
MCHC RBC-ENTMCNC: 32.4 GM/DL
MCV RBC AUTO: 88.4 FL
MICROSCOPIC-UA: NORMAL
MONOCYTES # BLD AUTO: 0.32 K/UL
MONOCYTES NFR BLD AUTO: 6.8 %
NEUTROPHILS # BLD AUTO: 1.93 K/UL
NEUTROPHILS NFR BLD AUTO: 40.9 %
NITRITE URINE: NEGATIVE
PH URINE: 5
PLATELET # BLD AUTO: 220 K/UL
POTASSIUM SERPL-SCNC: 3.9 MMOL/L
PROT SERPL-MCNC: 8.1 G/DL
PROTEIN URINE: NEGATIVE
PSA SERPL-MCNC: 0.56 NG/ML
RBC # BLD: 5.54 M/UL
RBC # FLD: 14 %
RED BLOOD CELLS URINE: 1 /HPF
SODIUM SERPL-SCNC: 141 MMOL/L
SPECIFIC GRAVITY URINE: 1.02
SQUAMOUS EPITHELIAL CELLS: 0 /HPF
TRIGL SERPL-MCNC: 149 MG/DL
TSH SERPL-ACNC: 5.7 UIU/ML
UROBILINOGEN URINE: NORMAL
WBC # FLD AUTO: 4.72 K/UL
WHITE BLOOD CELLS URINE: 1 /HPF

## 2020-03-17 ENCOUNTER — RX RENEWAL (OUTPATIENT)
Age: 55
End: 2020-03-17

## 2020-04-27 ENCOUNTER — APPOINTMENT (OUTPATIENT)
Dept: INTERNAL MEDICINE | Facility: CLINIC | Age: 55
End: 2020-04-27
Payer: MEDICAID

## 2020-04-27 PROCEDURE — 99213 OFFICE O/P EST LOW 20 MIN: CPT | Mod: 95

## 2020-04-27 NOTE — HISTORY OF PRESENT ILLNESS
[FreeTextEntry1] : TELEMEDICINE VISIT.  Request from patient; who has had 3 weeks of intermittent fevers, chills and malaise [de-identified] : He called and spoke with our nurse last week; was counseled it was likely COVID 19.  His mother, age 80, has been quarantining with him since her birthday [birth year] 11.  She's been fine fortunately.  He had chest tightness for a few days in early April but those symptoms have resolved.  He'd like an antibody\par \par Plans to go with mother to [place] in July

## 2020-04-27 NOTE — ASSESSMENT
[FreeTextEntry1] : \par 55 yr old on Truvada, with CKD and HTN\par he will continue to be cautious, likely recovered recently from COVID 19 based on clinical history\par \par f/u in office next month for labs/ prn\par \par

## 2020-04-27 NOTE — PHYSICAL EXAM
[Well Nourished] : well nourished [No Acute Distress] : no acute distress [Normal Outer Ear/Nose] : the outer ears and nose were normal in appearance [Normal Sclera/Conjunctiva] : normal sclera/conjunctiva [No Respiratory Distress] : no respiratory distress  [Coordination Grossly Intact] : coordination grossly intact [No Focal Deficits] : no focal deficits [Memory Grossly Normal] : memory grossly normal [Normal Affect] : the affect was normal [Normal Insight/Judgement] : insight and judgment were intact [Alert and Oriented x3] : oriented to person, place, and time

## 2020-05-06 ENCOUNTER — APPOINTMENT (OUTPATIENT)
Dept: NEPHROLOGY | Facility: CLINIC | Age: 55
End: 2020-05-06
Payer: MEDICAID

## 2020-05-06 DIAGNOSIS — B97.29 OTHER CORONAVIRUS AS THE CAUSE OF DISEASES CLASSIFIED ELSEWHERE: ICD-10-CM

## 2020-05-06 PROCEDURE — 99213 OFFICE O/P EST LOW 20 MIN: CPT | Mod: 95

## 2020-05-06 NOTE — ASSESSMENT
[FreeTextEntry1] : # HTN likely uncontrolled.\par * A counseling information sheet had been given. All their questions were answered.\par * The patient has been counseled to check their BP at home with an automatic arm cuff, write down the readings, and reach me directly on the phone immediately if they are persistently > 180 systolic or if SBP is less than 100 or if lightheadedness develops. They were counseled to bring in all blood pressure readings and medications next visit.\par * The patient has been counseled that they have a a significant medical condition and regular office followup (at least every 2 months for now) is essential for monitoring, and that it is their responsibility to make follow up appointments.\par * The patient also has been counseled that they must never stop or change any medications without discussing this with me (or another physician). \par \par # CKD stage 3.\par * The patient has been counseled that chronic kidney disease is a significant condition and regular office followup with me (at least every 2 months for now) is essential for monitoring, and that it is their responsibility to make a follow up appointment.\par * A counseling information sheet had been given. All their questions were answered.\par * The patient has been counseled never to stop taking their medications without discussing it with me or another doctor.\par * The patient has been counseled on risk of acute renal failure and instructed to immediately call and speak with me or go immediately to ER with any severe symptoms, nausea, vomiting, diarrhea, chest pain, or shortness of breath.\par * The patient has been counseled on avoiding NSAIDs.\par \par # Kidney stones.\par * Maintain high fluid intake.

## 2020-05-06 NOTE — HISTORY OF PRESENT ILLNESS
[FreeTextEntry1] : * On April 3 he developed fever and slight SOB. Diagnosed with probable COVID. Sx resolved completely 10days ago. * CKD creatinine, creatinine 1.48 on 2/18/20. * No symptoms of kidney stones. * He checked his BP only once this morning. BP 130s/90s. \par \par Previous history (09Apr19): * CKD stable, creatinine 1.26. * HTN controlled. No lightheadedness. Compliant with medications. * No symptoms of kidney stones. * Borderline DM improved. \par \par Previous history (07May19): • CKD stable, creatinine 1.2. No proteinuria or hematuria. •  HTN now controlled. No lightheadedness. *  Borderline DM improved. /92 standing in office. * Truvada, HCTZ, and losartan require monitoring. * No symptoms of kidney stones. * Borderline DM, HGA1c 5.6 (now improved).

## 2020-05-19 ENCOUNTER — APPOINTMENT (OUTPATIENT)
Dept: INTERNAL MEDICINE | Facility: CLINIC | Age: 55
End: 2020-05-19
Payer: MEDICAID

## 2020-05-19 ENCOUNTER — LABORATORY RESULT (OUTPATIENT)
Age: 55
End: 2020-05-19

## 2020-05-19 VITALS
TEMPERATURE: 98 F | DIASTOLIC BLOOD PRESSURE: 84 MMHG | HEART RATE: 83 BPM | WEIGHT: 251 LBS | HEIGHT: 72 IN | SYSTOLIC BLOOD PRESSURE: 130 MMHG | OXYGEN SATURATION: 97 % | BODY MASS INDEX: 34 KG/M2

## 2020-05-19 PROCEDURE — 99214 OFFICE O/P EST MOD 30 MIN: CPT | Mod: 25

## 2020-05-19 PROCEDURE — 36415 COLL VENOUS BLD VENIPUNCTURE: CPT

## 2020-05-20 LAB
ALBUMIN SERPL ELPH-MCNC: 5.1 G/DL
ALP BLD-CCNC: 62 U/L
ALT SERPL-CCNC: 57 U/L
ANION GAP SERPL CALC-SCNC: 16 MMOL/L
AST SERPL-CCNC: 57 U/L
BASOPHILS # BLD AUTO: 0.03 K/UL
BASOPHILS NFR BLD AUTO: 0.5 %
BILIRUB SERPL-MCNC: 0.7 MG/DL
BUN SERPL-MCNC: 22 MG/DL
C TRACH RRNA SPEC QL NAA+PROBE: NOT DETECTED
CALCIUM SERPL-MCNC: 10.1 MG/DL
CHLORIDE SERPL-SCNC: 103 MMOL/L
CO2 SERPL-SCNC: 22 MMOL/L
CREAT SERPL-MCNC: 1.24 MG/DL
EOSINOPHIL # BLD AUTO: 0.09 K/UL
EOSINOPHIL NFR BLD AUTO: 1.6 %
GLUCOSE SERPL-MCNC: 102 MG/DL
HCT VFR BLD CALC: 46.3 %
HCV AB SER QL: NONREACTIVE
HCV S/CO RATIO: 0.16 S/CO
HGB BLD-MCNC: 15 G/DL
HSV 1+2 IGG SER IA-IMP: POSITIVE
HSV 1+2 IGG SER IA-IMP: POSITIVE
HSV1 IGG SER QL: 34.6 INDEX
HSV2 IGG SER QL: 1.71 INDEX
IMM GRANULOCYTES NFR BLD AUTO: 0.3 %
LYMPHOCYTES # BLD AUTO: 3 K/UL
LYMPHOCYTES NFR BLD AUTO: 52.4 %
MAN DIFF?: NORMAL
MCHC RBC-ENTMCNC: 28.1 PG
MCHC RBC-ENTMCNC: 32.4 GM/DL
MCV RBC AUTO: 86.9 FL
MONOCYTES # BLD AUTO: 0.46 K/UL
MONOCYTES NFR BLD AUTO: 8 %
N GONORRHOEA RRNA SPEC QL NAA+PROBE: NOT DETECTED
NEUTROPHILS # BLD AUTO: 2.13 K/UL
NEUTROPHILS NFR BLD AUTO: 37.2 %
PLATELET # BLD AUTO: 181 K/UL
POTASSIUM SERPL-SCNC: 3.8 MMOL/L
PROT SERPL-MCNC: 7.6 G/DL
RBC # BLD: 5.33 M/UL
RBC # FLD: 13.2 %
SARS-COV-2 IGG SERPL IA-ACNC: <0.1 INDEX
SARS-COV-2 IGG SERPL QL IA: NEGATIVE
SODIUM SERPL-SCNC: 140 MMOL/L
SOURCE AMPLIFICATION: NORMAL
TSH SERPL-ACNC: 6.99 UIU/ML
WBC # FLD AUTO: 5.73 K/UL

## 2020-05-22 LAB
THYROGLOB AB SERPL-ACNC: <20 IU/ML
THYROPEROXIDASE AB SERPL IA-ACNC: <10 IU/ML

## 2020-06-30 ENCOUNTER — APPOINTMENT (OUTPATIENT)
Dept: NEPHROLOGY | Facility: CLINIC | Age: 55
End: 2020-06-30
Payer: MEDICAID

## 2020-06-30 VITALS — HEART RATE: 80 BPM | DIASTOLIC BLOOD PRESSURE: 90 MMHG | SYSTOLIC BLOOD PRESSURE: 127 MMHG

## 2020-06-30 VITALS — TEMPERATURE: 99.5 F | BODY MASS INDEX: 33.91 KG/M2 | WEIGHT: 250 LBS

## 2020-06-30 DIAGNOSIS — Z03.818 ENCOUNTER FOR OBSERVATION FOR SUSPECTED EXPOSURE TO OTHER BIOLOGICAL AGENTS RULED OUT: ICD-10-CM

## 2020-06-30 PROCEDURE — 99214 OFFICE O/P EST MOD 30 MIN: CPT

## 2020-06-30 NOTE — PHYSICAL EXAM
[General Appearance - In No Acute Distress] : in no acute distress [General Appearance - Alert] : alert [PERRL With Normal Accommodation] : pupils were equal in size, round, and reactive to light [Extraocular Movements] : extraocular movements were intact [Sclera] : the sclera and conjunctiva were normal [Neck Cervical Mass (___cm)] : no neck mass was observed [Neck Appearance] : the appearance of the neck was normal [Thyroid Diffuse Enlargement] : the thyroid was not enlarged [Jugular Venous Distention Increased] : there was no jugular-venous distention [Thyroid Nodule] : there were no palpable thyroid nodules [Auscultation Breath Sounds / Voice Sounds] : lungs were clear to auscultation bilaterally [Heart Sounds] : normal S1 and S2 [Heart Rate And Rhythm] : heart rate was normal and rhythm regular [Heart Sounds Gallop] : no gallops [Murmurs] : no murmurs [Heart Sounds Pericardial Friction Rub] : no pericardial rub [Edema] : there was no peripheral edema [Veins - Varicosity Changes] : there were no varicosital changes [Bowel Sounds] : normal bowel sounds [Abdomen Tenderness] : non-tender [Abdomen Soft] : soft [Cervical Lymph Nodes Enlarged Posterior Bilaterally] : posterior cervical [Abdomen Mass (___ Cm)] : no abdominal mass palpated [Supraclavicular Lymph Nodes Enlarged Bilaterally] : supraclavicular [Cervical Lymph Nodes Enlarged Anterior Bilaterally] : anterior cervical [Nail Clubbing] : no clubbing  or cyanosis of the fingernails [Abnormal Walk] : normal gait [Musculoskeletal - Swelling] : no joint swelling seen [Motor Tone] : muscle strength and tone were normal [Skin Color & Pigmentation] : normal skin color and pigmentation [] : no rash [Oriented To Time, Place, And Person] : oriented to person, place, and time [Skin Turgor] : normal skin turgor [Affect] : the affect was normal [Impaired Insight] : insight and judgment were intact

## 2020-06-30 NOTE — ASSESSMENT
[FreeTextEntry1] : # HTN uncontrolled.\par * Change HCTZ to chlorthalidone 12.5.\par * The patient's blood pressure was checked with the [employer] [id number]-[id number] using the SPRINT trial protocol after sitting quietly in an empty room with arm supported, back supported, and feet on the floor for 5 minutes. The average of 3 readings were taken.\par * A counseling information sheet has been given (currently or previously, in-person or electronically). All their questions were answered.\par * The patient has been counseled to check their BP at home with an automatic arm cuff, write down the readings, and reach me directly on the phone immediately if they are persistently > 180 systolic or if SBP is less than 100 or if lightheadedness develops. They were counseled to bring in all blood pressure readings and medications next visit.\par * The patient has been counseled that they have a a significant medical condition and regular office followup (at least every 3 weeks for now) is essential for monitoring, and that it is their responsibility to make follow up appointments.\par * The patient also has been counseled that they must never stop or change any medications without discussing this with me (or another physician). \par \par # CKD stage 3.\par * The patient has been counseled that chronic kidney disease is a significant condition and regular office followup with me (at least every 3 weeks for now) is essential for monitoring, and that it is their responsibility to make a follow up appointment.\par * A counseling information sheet has been given (currently or previously, in-person or electronically). All their questions were answered.\par * The patient has been counseled never to stop taking their medications without discussing it with me or another doctor.\par * The patient has been counseled on risk of acute renal failure and instructed to immediately call and speak with me or go immediately to ER with any severe symptoms, nausea, vomiting, diarrhea, chest pain, or shortness of breath.\par * The patient has been counseled on avoiding NSAIDs.\par \par # Kidney stones.\par * Maintain high fluid intake.\par \par # SARS-CoV-2 pandemic counseling. No current evidence of infection.\par * Counseled to stay physically distanced.\par * Counseled to limit all nonessential travel.\par * Counseled on mask wearing.\par * Counseled on hand hygiene.\par * Counseled to contact a doctor with fever, respiratory symptoms, or anosmia.\par * Counseled on obtaining flu shot this year when available. \par

## 2020-06-30 NOTE — HISTORY OF PRESENT ILLNESS
[FreeTextEntry1] : * Covid AB ([facility]) negative. * CKD previously improved, creatinine 1.25. * No symptoms of kidney stones. * He has not rechecked BP at home, previously 120s/90s. * He has stopped truvada. \par \par Previous history (06May20): * On April 3 he developed fever and slight SOB. Diagnosed with probable COVID. Sx resolved completely 10days ago. * CKD creatinine, creatinine 1.48 on 2/18/20. * No symptoms of kidney stones. * He checked his BP only once this morning. BP 130s/90s. \par \par Previous history (09Apr19): * CKD stable, creatinine 1.26. * HTN controlled. No lightheadedness. Compliant with medications. * No symptoms of kidney stones. * Borderline DM improved. \par \par Previous history (07May19): • CKD stable, creatinine 1.2. No proteinuria or hematuria. •  HTN now controlled. No lightheadedness. *  Borderline DM improved. /92 standing in office. * Truvada, HCTZ, and losartan require monitoring. * No symptoms of kidney stones. * Borderline DM, HGA1c 5.6 (now improved).

## 2020-08-10 ENCOUNTER — APPOINTMENT (OUTPATIENT)
Dept: INTERNAL MEDICINE | Facility: CLINIC | Age: 55
End: 2020-08-10
Payer: MEDICAID

## 2020-08-25 ENCOUNTER — APPOINTMENT (OUTPATIENT)
Dept: NEPHROLOGY | Facility: CLINIC | Age: 55
End: 2020-08-25
Payer: MEDICAID

## 2020-08-25 VITALS — TEMPERATURE: 99.2 F | BODY MASS INDEX: 35.15 KG/M2 | WEIGHT: 259.2 LBS

## 2020-08-25 VITALS — DIASTOLIC BLOOD PRESSURE: 98 MMHG | SYSTOLIC BLOOD PRESSURE: 139 MMHG | HEART RATE: 100 BPM

## 2020-08-25 DIAGNOSIS — Z11.59 ENCOUNTER FOR SCREENING FOR OTHER VIRAL DISEASES: ICD-10-CM

## 2020-08-25 PROCEDURE — 99214 OFFICE O/P EST MOD 30 MIN: CPT | Mod: 25

## 2020-08-25 PROCEDURE — 36415 COLL VENOUS BLD VENIPUNCTURE: CPT

## 2020-08-25 NOTE — ASSESSMENT
[FreeTextEntry1] : # HTN borderline controlled. Possible white coat effect today.\par * The patient's blood pressure was checked with the [employer] [id number]-[id number] using the SPRINT trial protocol after sitting quietly in an empty room with arm supported, back supported, and feet on the floor for 5 minutes. The average of 3 readings were taken.\par * A counseling information sheet has been given (currently or previously, in-person or electronically). All their questions were answered.\par * The patient has been counseled to check their BP at home with an automatic arm cuff, write down the readings, and reach me directly on the phone immediately if they are persistently > 180 systolic or if SBP is less than 100 or if lightheadedness develops. They were counseled to bring in all blood pressure readings and medications next visit.\par * The patient has been counseled that they have a a significant medical condition and regular office followup (at least every 4 months for now) is essential for monitoring, and that it is their responsibility to make follow up appointments.\par * The patient also has been counseled that they must never stop or change any medications without discussing this with me (or another physician). \par \par # CKD stage 3.\par * Recheck labs.\par * The patient has been counseled that chronic kidney disease is a significant condition and regular office followup with me (at least every 4 months for now) is essential for monitoring, and that it is their responsibility to make a follow up appointment.\par * A counseling information sheet has been given (currently or previously, in-person or electronically). All their questions were answered.\par * The patient has been counseled never to stop taking their medications without discussing it with me or another doctor.\par * The patient has been counseled on risk of acute renal failure and instructed to immediately call and speak with me or go immediately to ER with any severe symptoms, nausea, vomiting, diarrhea, chest pain, or shortness of breath.\par * The patient has been counseled on avoiding NSAIDs.\par \par # Nephrolithiasis.\par * Maintain high fluid intake.

## 2020-08-25 NOTE — HISTORY OF PRESENT ILLNESS
[FreeTextEntry1] : * CKD previously stable, creatinine 1.24. Off truvada. * HTN borderline controlled, 120s/80s. Increased stress today. * No symptoms of kidney stones. \par \par Previous history (30Jun20): * Covid AB ([facility]) negative. * CKD previously improved, creatinine 1.25. * No symptoms of kidney stones. * He has not rechecked BP at home, previously 120s/90s. * He has stopped truvada. \par \par Previous history (06May20): * On April 3 he developed fever and slight SOB. Diagnosed with probable COVID. Sx resolved completely 10days ago. * CKD creatinine, creatinine 1.48 on 2/18/20. * No symptoms of kidney stones. * He checked his BP only once this morning. BP 130s/90s. \par \par Previous history (09Apr19): * CKD stable, creatinine 1.26. * HTN controlled. No lightheadedness. Compliant with medications. * No symptoms of kidney stones. * Borderline DM improved. \par \par Previous history (07May19): • CKD stable, creatinine 1.2. No proteinuria or hematuria. •  HTN now controlled. No lightheadedness. *  Borderline DM improved. /92 standing in office. * Truvada, HCTZ, and losartan require monitoring. * No symptoms of kidney stones. * Borderline DM, HGA1c 5.6 (now improved).

## 2020-08-25 NOTE — PHYSICAL EXAM
[General Appearance - Alert] : alert [General Appearance - In No Acute Distress] : in no acute distress [Extraocular Movements] : extraocular movements were intact [PERRL With Normal Accommodation] : pupils were equal in size, round, and reactive to light [Sclera] : the sclera and conjunctiva were normal [Neck Appearance] : the appearance of the neck was normal [Outer Ear] : the ears and nose were normal in appearance [Neck Cervical Mass (___cm)] : no neck mass was observed [Thyroid Diffuse Enlargement] : the thyroid was not enlarged [Jugular Venous Distention Increased] : there was no jugular-venous distention [Thyroid Nodule] : there were no palpable thyroid nodules [Auscultation Breath Sounds / Voice Sounds] : lungs were clear to auscultation bilaterally [Heart Rate And Rhythm] : heart rate was normal and rhythm regular [Heart Sounds Gallop] : no gallops [Heart Sounds] : normal S1 and S2 [Heart Sounds Pericardial Friction Rub] : no pericardial rub [Murmurs] : no murmurs [Bowel Sounds] : normal bowel sounds [Veins - Varicosity Changes] : there were no varicosital changes [Edema] : there was no peripheral edema [Abdomen Soft] : soft [Abdomen Tenderness] : non-tender [Cervical Lymph Nodes Enlarged Posterior Bilaterally] : posterior cervical [Abdomen Mass (___ Cm)] : no abdominal mass palpated [Cervical Lymph Nodes Enlarged Anterior Bilaterally] : anterior cervical [Nail Clubbing] : no clubbing  or cyanosis of the fingernails [Supraclavicular Lymph Nodes Enlarged Bilaterally] : supraclavicular [Abnormal Walk] : normal gait [Motor Tone] : muscle strength and tone were normal [Skin Color & Pigmentation] : normal skin color and pigmentation [Musculoskeletal - Swelling] : no joint swelling seen [Skin Turgor] : normal skin turgor [] : no rash [Oriented To Time, Place, And Person] : oriented to person, place, and time [Impaired Insight] : insight and judgment were intact [Affect] : the affect was normal

## 2020-08-29 LAB
25(OH)D3 SERPL-MCNC: 38.1 NG/ML
ALBUMIN SERPL ELPH-MCNC: 5.1 G/DL
ALP BLD-CCNC: 62 U/L
ALT SERPL-CCNC: 61 U/L
ANION GAP SERPL CALC-SCNC: 17 MMOL/L
APPEARANCE: CLEAR
AST SERPL-CCNC: 57 U/L
BASOPHILS # BLD AUTO: 0.02 K/UL
BASOPHILS NFR BLD AUTO: 0.4 %
BILIRUB SERPL-MCNC: 0.7 MG/DL
BILIRUBIN URINE: NEGATIVE
BLOOD URINE: NORMAL
BUN SERPL-MCNC: 17 MG/DL
CALCIUM SERPL-MCNC: 9.7 MG/DL
CALCIUM SERPL-MCNC: 9.7 MG/DL
CHLORIDE SERPL-SCNC: 103 MMOL/L
CHOLEST SERPL-MCNC: 184 MG/DL
CHOLEST/HDLC SERPL: 4.2 RATIO
CO2 SERPL-SCNC: 23 MMOL/L
COLOR: NORMAL
CREAT SERPL-MCNC: 1.37 MG/DL
CREAT SPEC-SCNC: 172 MG/DL
CREAT SPEC-SCNC: 172 MG/DL
CREAT/PROT UR: 0.1 RATIO
EOSINOPHIL # BLD AUTO: 0.03 K/UL
EOSINOPHIL NFR BLD AUTO: 0.5 %
ESTIMATED AVERAGE GLUCOSE: 111 MG/DL
FERRITIN SERPL-MCNC: 477 NG/ML
GLUCOSE QUALITATIVE U: NEGATIVE
GLUCOSE SERPL-MCNC: 118 MG/DL
HBA1C MFR BLD HPLC: 5.5 %
HCT VFR BLD CALC: 48.1 %
HDLC SERPL-MCNC: 43 MG/DL
HGB BLD-MCNC: 15.5 G/DL
IMM GRANULOCYTES NFR BLD AUTO: 0.4 %
KETONES URINE: NEGATIVE
LDLC SERPL CALC-MCNC: 69 MG/DL
LEUKOCYTE ESTERASE URINE: NEGATIVE
LYMPHOCYTES # BLD AUTO: 1.55 K/UL
LYMPHOCYTES NFR BLD AUTO: 28.1 %
MAGNESIUM SERPL-MCNC: 2.2 MG/DL
MAN DIFF?: NORMAL
MCHC RBC-ENTMCNC: 28 PG
MCHC RBC-ENTMCNC: 32.2 GM/DL
MCV RBC AUTO: 87 FL
MICROALBUMIN 24H UR DL<=1MG/L-MCNC: 2.9 MG/DL
MICROALBUMIN/CREAT 24H UR-RTO: 17 MG/G
MONOCYTES # BLD AUTO: 0.47 K/UL
MONOCYTES NFR BLD AUTO: 8.5 %
NEUTROPHILS # BLD AUTO: 3.43 K/UL
NEUTROPHILS NFR BLD AUTO: 62.1 %
NITRITE URINE: NEGATIVE
PARATHYROID HORMONE INTACT: 23 PG/ML
PH URINE: 5.5
PHOSPHATE SERPL-MCNC: 2.7 MG/DL
PLATELET # BLD AUTO: 192 K/UL
POTASSIUM SERPL-SCNC: 3.7 MMOL/L
PROT SERPL-MCNC: 7.6 G/DL
PROT UR-MCNC: 16 MG/DL
PROTEIN URINE: NORMAL
RBC # BLD: 5.53 M/UL
RBC # FLD: 13.3 %
SARS-COV-2 IGG SERPL IA-ACNC: 0.09 INDEX
SARS-COV-2 IGG SERPL QL IA: NEGATIVE
SODIUM SERPL-SCNC: 143 MMOL/L
SPECIFIC GRAVITY URINE: 1.02
TRIGL SERPL-MCNC: 358 MG/DL
TSH SERPL-ACNC: 2.01 UIU/ML
URATE SERPL-MCNC: 6.3 MG/DL
UROBILINOGEN URINE: NORMAL
VIT B12 SERPL-MCNC: 414 PG/ML
WBC # FLD AUTO: 5.52 K/UL

## 2020-09-14 ENCOUNTER — LABORATORY RESULT (OUTPATIENT)
Age: 55
End: 2020-09-14

## 2020-09-14 ENCOUNTER — APPOINTMENT (OUTPATIENT)
Dept: INTERNAL MEDICINE | Facility: CLINIC | Age: 55
End: 2020-09-14
Payer: MEDICAID

## 2020-09-14 VITALS
HEART RATE: 109 BPM | BODY MASS INDEX: 34.54 KG/M2 | DIASTOLIC BLOOD PRESSURE: 90 MMHG | HEIGHT: 72 IN | OXYGEN SATURATION: 97 % | WEIGHT: 255 LBS | SYSTOLIC BLOOD PRESSURE: 131 MMHG | TEMPERATURE: 97.7 F

## 2020-09-14 DIAGNOSIS — Z23 ENCOUNTER FOR IMMUNIZATION: ICD-10-CM

## 2020-09-14 PROCEDURE — G0008: CPT

## 2020-09-14 PROCEDURE — 90686 IIV4 VACC NO PRSV 0.5 ML IM: CPT

## 2020-09-14 PROCEDURE — 99214 OFFICE O/P EST MOD 30 MIN: CPT | Mod: 25

## 2020-09-14 PROCEDURE — 36415 COLL VENOUS BLD VENIPUNCTURE: CPT

## 2020-09-14 RX ORDER — CHLORHEXIDINE GLUCONATE, 0.12% ORAL RINSE 1.2 MG/ML
0.12 SOLUTION DENTAL
Qty: 473 | Refills: 0 | Status: DISCONTINUED | COMMUNITY
Start: 2020-06-29

## 2020-09-15 LAB
HCV AB SER QL: NONREACTIVE
HCV S/CO RATIO: 0.12 S/CO
HIV1+2 AB SPEC QL IA.RAPID: NONREACTIVE
HSV 1+2 IGG SER IA-IMP: POSITIVE
HSV 1+2 IGG SER IA-IMP: POSITIVE
HSV1 IGG SER QL: 34.5 INDEX
HSV2 IGG SER QL: 5.82 INDEX

## 2020-09-22 LAB — T PALLIDUM AB SER QL IA: POSITIVE

## 2020-12-06 ENCOUNTER — RX RENEWAL (OUTPATIENT)
Age: 55
End: 2020-12-06

## 2020-12-09 ENCOUNTER — APPOINTMENT (OUTPATIENT)
Dept: INTERNAL MEDICINE | Facility: CLINIC | Age: 55
End: 2020-12-09
Payer: MEDICAID

## 2020-12-09 VITALS
DIASTOLIC BLOOD PRESSURE: 88 MMHG | OXYGEN SATURATION: 98 % | HEIGHT: 72 IN | TEMPERATURE: 97.7 F | WEIGHT: 266 LBS | SYSTOLIC BLOOD PRESSURE: 129 MMHG | HEART RATE: 111 BPM | BODY MASS INDEX: 36.03 KG/M2

## 2020-12-09 PROCEDURE — 99072 ADDL SUPL MATRL&STAF TM PHE: CPT

## 2020-12-09 PROCEDURE — 99213 OFFICE O/P EST LOW 20 MIN: CPT

## 2020-12-15 ENCOUNTER — LABORATORY RESULT (OUTPATIENT)
Age: 55
End: 2020-12-15

## 2020-12-15 ENCOUNTER — APPOINTMENT (OUTPATIENT)
Dept: NEPHROLOGY | Facility: CLINIC | Age: 55
End: 2020-12-15
Payer: MEDICAID

## 2020-12-15 VITALS — HEART RATE: 83 BPM | SYSTOLIC BLOOD PRESSURE: 118 MMHG | DIASTOLIC BLOOD PRESSURE: 84 MMHG

## 2020-12-15 DIAGNOSIS — N28.1 CYST OF KIDNEY, ACQUIRED: ICD-10-CM

## 2020-12-15 PROCEDURE — 99214 OFFICE O/P EST MOD 30 MIN: CPT

## 2020-12-15 PROCEDURE — 99072 ADDL SUPL MATRL&STAF TM PHE: CPT

## 2020-12-15 NOTE — ASSESSMENT
[FreeTextEntry1] : # HTN controlled.\par * The patient's blood pressure was checked with the [employer] [id number]-[id number] using the SPRINT trial protocol after sitting quietly in an empty room with arm supported, back supported, and feet on the floor for 5 minutes. The average of 3 readings were taken.\par * A counseling information sheet has been given (currently or previously, in-person or electronically). All their questions were answered.\par * The patient has been counseled to check their BP at home with an automatic arm cuff, write down the readings, and reach me directly on the phone immediately if they are persistently > 180 systolic or if SBP is less than 100 or if lightheadedness develops. They were counseled to bring in all blood pressure readings and medications next visit.\par * The patient has been counseled that they have a a significant medical condition and regular office followup (at least every 4 months for now) is essential for monitoring, and that it is their responsibility to make follow up appointments.\par * The patient also has been counseled that they must never stop or change any medications without discussing this with me (or another physician). \par \par # CKD stage 3.\par * Recheck labs.\par * The patient has been counseled that chronic kidney disease is a significant condition and regular office followup with me (at least every 4 months for now) is essential for monitoring, and that it is their responsibility to make a follow up appointment.\par * A counseling information sheet has been given (currently or previously, in-person or electronically). All their questions were answered.\par * The patient has been counseled never to stop taking their medications without discussing it with me or another doctor.\par * The patient has been counseled on risk of acute renal failure and instructed to immediately call and speak with me or go immediately to ER with any severe symptoms, nausea, vomiting, diarrhea, chest pain, or shortness of breath.\par * The patient has been counseled on avoiding NSAIDs.\par \par # Nephrolithiasis.\par * Maintain high fluid intake.\par * Recheck kidney ultrasound. \par

## 2020-12-15 NOTE — HISTORY OF PRESENT ILLNESS
[FreeTextEntry1] : PCP: Dr. Shaniqua Briseno\par \par * HTN controlled. No lightheadedness. * No symptoms of kidneys stones. * CKD stable, creatinine 1.37. Off truvada. \par \par Previous history (25Aug20): * CKD previously stable, creatinine 1.24. Off truvada. * HTN borderline controlled, 120s/80s. Increased stress today. * No symptoms of kidney stones. \par \par Previous history (30Jun20): * Covid AB ([facility]) negative. * CKD previously improved, creatinine 1.25. * No symptoms of kidney stones. * He has not rechecked BP at home, previously 120s/90s. * He has stopped truvada. \par \par Previous history (06May20): * On April 3 he developed fever and slight SOB. Diagnosed with probable COVID. Sx resolved completely 10days ago. * CKD creatinine, creatinine 1.48 on 2/18/20. * No symptoms of kidney stones. * He checked his BP only once this morning. BP 130s/90s. \par \par Previous history (09Apr19): * CKD stable, creatinine 1.26. * HTN controlled. No lightheadedness. Compliant with medications. * No symptoms of kidney stones. * Borderline DM improved. \par \par Previous history (07May19): • CKD stable, creatinine 1.2. No proteinuria or hematuria. •  HTN now controlled. No lightheadedness. *  Borderline DM improved. /92 standing in office. * Truvada, HCTZ, and losartan require monitoring. * No symptoms of kidney stones. * Borderline DM, HGA1c 5.6 (now improved).

## 2020-12-24 LAB
25(OH)D3 SERPL-MCNC: 23.6 NG/ML
ALBUMIN SERPL ELPH-MCNC: 5.1 G/DL
ALP BLD-CCNC: 59 U/L
ALT SERPL-CCNC: 76 U/L
ANION GAP SERPL CALC-SCNC: 11 MMOL/L
APPEARANCE: ABNORMAL
AST SERPL-CCNC: 52 U/L
BASOPHILS # BLD AUTO: 0.03 K/UL
BASOPHILS NFR BLD AUTO: 0.6 %
BILIRUB SERPL-MCNC: 0.6 MG/DL
BILIRUBIN URINE: NEGATIVE
BLOOD URINE: NEGATIVE
BUN SERPL-MCNC: 18 MG/DL
CALCIUM SERPL-MCNC: 9.9 MG/DL
CALCIUM SERPL-MCNC: 9.9 MG/DL
CHLORIDE SERPL-SCNC: 101 MMOL/L
CHOLEST SERPL-MCNC: 182 MG/DL
CO2 SERPL-SCNC: 28 MMOL/L
COLOR: YELLOW
CREAT SERPL-MCNC: 1.31 MG/DL
CREAT SPEC-SCNC: 108 MG/DL
CREAT SPEC-SCNC: 108 MG/DL
CREAT/PROT UR: 0.1 RATIO
EOSINOPHIL # BLD AUTO: 0.05 K/UL
EOSINOPHIL NFR BLD AUTO: 0.9 %
ESTIMATED AVERAGE GLUCOSE: 123 MG/DL
FERRITIN SERPL-MCNC: 596 NG/ML
GLUCOSE QUALITATIVE U: NEGATIVE
GLUCOSE SERPL-MCNC: 107 MG/DL
HBA1C MFR BLD HPLC: 5.9 %
HCT VFR BLD CALC: 46.8 %
HDLC SERPL-MCNC: 46 MG/DL
HGB BLD-MCNC: 15 G/DL
IMM GRANULOCYTES NFR BLD AUTO: 0.4 %
KETONES URINE: NEGATIVE
LDLC SERPL CALC-MCNC: 105 MG/DL
LEUKOCYTE ESTERASE URINE: NEGATIVE
LYMPHOCYTES # BLD AUTO: 2.4 K/UL
LYMPHOCYTES NFR BLD AUTO: 44.1 %
MAGNESIUM SERPL-MCNC: 2.1 MG/DL
MAN DIFF?: NORMAL
MCHC RBC-ENTMCNC: 26.9 PG
MCHC RBC-ENTMCNC: 32.1 GM/DL
MCV RBC AUTO: 84 FL
MICROALBUMIN 24H UR DL<=1MG/L-MCNC: <1.2 MG/DL
MICROALBUMIN/CREAT 24H UR-RTO: NORMAL MG/G
MONOCYTES # BLD AUTO: 0.49 K/UL
MONOCYTES NFR BLD AUTO: 9 %
NEUTROPHILS # BLD AUTO: 2.45 K/UL
NEUTROPHILS NFR BLD AUTO: 45 %
NITRITE URINE: NEGATIVE
NONHDLC SERPL-MCNC: 136 MG/DL
PARATHYROID HORMONE INTACT: 19 PG/ML
PH URINE: 5.5
PHOSPHATE SERPL-MCNC: 3 MG/DL
PLATELET # BLD AUTO: 196 K/UL
POTASSIUM SERPL-SCNC: 4 MMOL/L
PROT SERPL-MCNC: 7.7 G/DL
PROT UR-MCNC: 5 MG/DL
PROTEIN URINE: NEGATIVE
RBC # BLD: 5.57 M/UL
RBC # FLD: 13.6 %
SODIUM SERPL-SCNC: 139 MMOL/L
SPECIFIC GRAVITY URINE: 1.01
TRIGL SERPL-MCNC: 158 MG/DL
TSH SERPL-ACNC: 2.12 UIU/ML
UROBILINOGEN URINE: NORMAL
VIT B12 SERPL-MCNC: 498 PG/ML
WBC # FLD AUTO: 5.44 K/UL

## 2021-01-23 NOTE — HISTORY OF PRESENT ILLNESS
[FreeTextEntry1] : * CKD stable, creatinine 1.26. * HTN controlled. No lightheadedness. Compliant with medications. * No symptoms of kidney stones. * Borderline DM improved. \par \par Previous history (07May19): • CKD stable, creatinine 1.2. No proteinuria or hematuria. •  HTN now controlled. No lightheadedness. *  Borderline DM improved. /92 standing in office. * Truvada, HCTZ, and losartan require monitoring. * No symptoms of kidney stones. * Borderline DM, HGA1c 5.6 (now improved). 
Spontaneous, unlabored and symmetrical

## 2021-02-04 ENCOUNTER — RX RENEWAL (OUTPATIENT)
Age: 56
End: 2021-02-04

## 2021-02-15 ENCOUNTER — RX RENEWAL (OUTPATIENT)
Age: 56
End: 2021-02-15

## 2021-03-15 ENCOUNTER — RX RENEWAL (OUTPATIENT)
Age: 56
End: 2021-03-15

## 2021-04-13 ENCOUNTER — APPOINTMENT (OUTPATIENT)
Dept: NEPHROLOGY | Facility: CLINIC | Age: 56
End: 2021-04-13

## 2021-04-26 ENCOUNTER — RX RENEWAL (OUTPATIENT)
Age: 56
End: 2021-04-26

## 2021-05-03 ENCOUNTER — RX RENEWAL (OUTPATIENT)
Age: 56
End: 2021-05-03

## 2021-05-12 ENCOUNTER — APPOINTMENT (OUTPATIENT)
Dept: INTERNAL MEDICINE | Facility: CLINIC | Age: 56
End: 2021-05-12
Payer: MEDICAID

## 2021-05-12 ENCOUNTER — RX RENEWAL (OUTPATIENT)
Age: 56
End: 2021-05-12

## 2021-05-12 VITALS
SYSTOLIC BLOOD PRESSURE: 105 MMHG | TEMPERATURE: 97.4 F | WEIGHT: 265 LBS | HEIGHT: 72 IN | DIASTOLIC BLOOD PRESSURE: 82 MMHG | OXYGEN SATURATION: 97 % | HEART RATE: 89 BPM | BODY MASS INDEX: 35.89 KG/M2

## 2021-05-12 DIAGNOSIS — Z72.51 HIGH RISK HETEROSEXUAL BEHAVIOR: ICD-10-CM

## 2021-05-12 PROCEDURE — 99072 ADDL SUPL MATRL&STAF TM PHE: CPT

## 2021-05-12 PROCEDURE — 36415 COLL VENOUS BLD VENIPUNCTURE: CPT

## 2021-05-12 PROCEDURE — 99214 OFFICE O/P EST MOD 30 MIN: CPT | Mod: 25

## 2021-05-13 LAB
ALBUMIN SERPL ELPH-MCNC: 5.2 G/DL
ALP BLD-CCNC: 67 U/L
ALT SERPL-CCNC: 64 U/L
ANION GAP SERPL CALC-SCNC: 15 MMOL/L
AST SERPL-CCNC: 47 U/L
BILIRUB SERPL-MCNC: 0.8 MG/DL
BUN SERPL-MCNC: 15 MG/DL
CALCIUM SERPL-MCNC: 10.4 MG/DL
CHLORIDE SERPL-SCNC: 100 MMOL/L
CO2 SERPL-SCNC: 23 MMOL/L
CREAT SERPL-MCNC: 1.19 MG/DL
CREAT SPEC-SCNC: 196 MG/DL
ESTIMATED AVERAGE GLUCOSE: 120 MG/DL
GLUCOSE SERPL-MCNC: 110 MG/DL
HBA1C MFR BLD HPLC: 5.8 %
MICROALBUMIN 24H UR DL<=1MG/L-MCNC: 2.2 MG/DL
MICROALBUMIN/CREAT 24H UR-RTO: 11 MG/G
POTASSIUM SERPL-SCNC: 3.7 MMOL/L
PROT SERPL-MCNC: 7.9 G/DL
SODIUM SERPL-SCNC: 138 MMOL/L

## 2021-06-02 ENCOUNTER — RX RENEWAL (OUTPATIENT)
Age: 56
End: 2021-06-02

## 2021-06-02 ENCOUNTER — APPOINTMENT (OUTPATIENT)
Dept: NEPHROLOGY | Facility: CLINIC | Age: 56
End: 2021-06-02

## 2021-07-02 ENCOUNTER — APPOINTMENT (OUTPATIENT)
Dept: NEPHROLOGY | Facility: CLINIC | Age: 56
End: 2021-07-02
Payer: MEDICAID

## 2021-07-02 VITALS — HEART RATE: 96 BPM | SYSTOLIC BLOOD PRESSURE: 128 MMHG | DIASTOLIC BLOOD PRESSURE: 97 MMHG

## 2021-07-02 VITALS — BODY MASS INDEX: 36.62 KG/M2 | WEIGHT: 270 LBS | TEMPERATURE: 98.4 F

## 2021-07-02 PROCEDURE — 99072 ADDL SUPL MATRL&STAF TM PHE: CPT

## 2021-07-02 PROCEDURE — 99214 OFFICE O/P EST MOD 30 MIN: CPT

## 2021-07-02 NOTE — ASSESSMENT
[FreeTextEntry1] : # Fatty liver.\par * Counseling: advised to abstain from alcohol\par * Therapies: weight loss counseling, increased physical activity, blood glucose control, lipid lowering therapy (if needed)\par * Nondiagnostic liver fibrosis (FIB-4) index. Advised to make appointment for GI evaluation. They were instructed that this referral is important for their health and that it is their responsibility to make and keep this appointment. All their questions were answered. \par \par # HTN controlled. White coat component.\par * Continue losartan and chlorthalidone.\par * The patient's blood pressure was checked with the [employer] [id number]-[id number] using the SPRINT trial protocol after sitting quietly in an empty room with arm supported, back supported, and feet on the floor for 5 minutes. The average of 3 readings were taken.\par * A counseling information sheet has been given (currently or previously, in-person or electronically). All their questions were answered.\par * The patient has been counseled to check their BP at home with an automatic arm cuff, write down the readings, and reach me directly on the phone immediately if they are persistently > 180 systolic or if SBP is less than 100 or if lightheadedness develops. They were counseled to bring in all blood pressure readings and medications next visit.\par * The patient has been counseled that regular office followup (at least every 4 months for now)  is important for monitoring and for their health, and that it is their responsibility to make follow up appointments.\par * The patient also has been counseled that they must never stop or change any medications without discussing this with me (or another physician). \par \par # CKD stage 3 improving.\par * Therapies for kidney disease: blood pressure control; other evidence-based therapies including exercise, a plant-based lower oxalate diet, and 400 mcg folic acid daily\par * Cardiovascular disease prevention: counseling on healthy diet, physical activity, weight loss, alcohol limitation, blood pressure control; moderate intensity statin therapy\par * The patient has been counseled that chronic kidney disease is a significant condition and regular office followup with me (at least every 4 months for now) is important for monitoring and their health, and that it is their responsibility to make a follow up appointment.\par * The patient has been counseled never to stop taking their medications without discussing it with me or another doctor.\par * The patient has been counseled on avoiding NSAIDs.\par * The patient has been counseled on risk of acute renal failure and instructed to immediately call and speak with me or go immediately to ER with any severe symptoms, nausea, vomiting, diarrhea, chest pain, or shortness of breath.\par * A counseling information sheet has been given (today or previously). All their questions were answered.\par

## 2021-07-02 NOTE — PHYSICAL EXAM
[General Appearance - Alert] : alert [General Appearance - In No Acute Distress] : in no acute distress [Neck Appearance] : the appearance of the neck was normal [Neck Cervical Mass (___cm)] : no neck mass was observed [Jugular Venous Distention Increased] : there was no jugular-venous distention [Thyroid Diffuse Enlargement] : the thyroid was not enlarged [Thyroid Nodule] : there were no palpable thyroid nodules [Auscultation Breath Sounds / Voice Sounds] : lungs were clear to auscultation bilaterally [Heart Rate And Rhythm] : heart rate was normal and rhythm regular [Heart Sounds] : normal S1 and S2 [Heart Sounds Gallop] : no gallops [Murmurs] : no murmurs [Heart Sounds Pericardial Friction Rub] : no pericardial rub [Veins - Varicosity Changes] : there were no varicosital changes [Edema] : there was no peripheral edema [Bowel Sounds] : normal bowel sounds [Abdomen Soft] : soft [Abdomen Tenderness] : non-tender [] : no hepato-splenomegaly [Abdomen Mass (___ Cm)] : no abdominal mass palpated [Cervical Lymph Nodes Enlarged Posterior Bilaterally] : posterior cervical [Cervical Lymph Nodes Enlarged Anterior Bilaterally] : anterior cervical [Supraclavicular Lymph Nodes Enlarged Bilaterally] : supraclavicular

## 2021-07-02 NOTE — HISTORY OF PRESENT ILLNESS
[FreeTextEntry1] : PCP: Dr. Shaniqua Briseno\par \par *  with Dr. Briseno. He hasn't checked BP at home. * No symptoms of kdiney stones. * Off truvada. * CKD improved, creatinine 1.19. No albuminuria. * AST/ALT abnormal. CT previously showed fatty liver. 1 drink or less daily. 5 drinks weekly. \par \par Previous history (29Lyq05): * HTN controlled. No lightheadedness. * No symptoms of kidneys stones. * CKD stable, creatinine 1.37. Off truvada. \par \par Previous history (25Aug20): * CKD previously stable, creatinine 1.24. Off truvada. * HTN borderline controlled, 120s/80s. Increased stress today. * No symptoms of kidney stones. \par \par Previous history (30Jun20): * Covid AB ([facility]) negative. * CKD previously improved, creatinine 1.25. * No symptoms of kidney stones. * He has not rechecked BP at home, previously 120s/90s. * He has stopped truvada. \par \par Previous history (06May20): * On April 3 he developed fever and slight SOB. Diagnosed with probable COVID. Sx resolved completely 10days ago. * CKD creatinine, creatinine 1.48 on 2/18/20. * No symptoms of kidney stones. * He checked his BP only once this morning. BP 130s/90s. \par \par Previous history (09Apr19): * CKD stable, creatinine 1.26. * HTN controlled. No lightheadedness. Compliant with medications. * No symptoms of kidney stones. * Borderline DM improved. \par \par Previous history (07May19): • CKD stable, creatinine 1.2. No proteinuria or hematuria. •  HTN now controlled. No lightheadedness. *  Borderline DM improved. /92 standing in office. * Truvada, HCTZ, and losartan require monitoring. * No symptoms of kidney stones. * Borderline DM, HGA1c 5.6 (now improved).

## 2021-07-26 ENCOUNTER — RX RENEWAL (OUTPATIENT)
Age: 56
End: 2021-07-26

## 2021-08-20 ENCOUNTER — RX RENEWAL (OUTPATIENT)
Age: 56
End: 2021-08-20

## 2021-08-30 ENCOUNTER — RX RENEWAL (OUTPATIENT)
Age: 56
End: 2021-08-30

## 2021-09-15 ENCOUNTER — RX RENEWAL (OUTPATIENT)
Age: 56
End: 2021-09-15

## 2021-09-21 ENCOUNTER — APPOINTMENT (OUTPATIENT)
Age: 56
End: 2021-09-21

## 2021-11-10 ENCOUNTER — LABORATORY RESULT (OUTPATIENT)
Age: 56
End: 2021-11-10

## 2021-11-10 ENCOUNTER — RESULT REVIEW (OUTPATIENT)
Age: 56
End: 2021-11-10

## 2021-11-10 ENCOUNTER — RESULT CHARGE (OUTPATIENT)
Age: 56
End: 2021-11-10

## 2021-11-10 ENCOUNTER — NON-APPOINTMENT (OUTPATIENT)
Age: 56
End: 2021-11-10

## 2021-11-10 ENCOUNTER — APPOINTMENT (OUTPATIENT)
Dept: INTERNAL MEDICINE | Facility: CLINIC | Age: 56
End: 2021-11-10
Payer: MEDICAID

## 2021-11-10 VITALS
DIASTOLIC BLOOD PRESSURE: 82 MMHG | SYSTOLIC BLOOD PRESSURE: 120 MMHG | BODY MASS INDEX: 34.27 KG/M2 | HEART RATE: 82 BPM | TEMPERATURE: 95.4 F | WEIGHT: 253 LBS | HEIGHT: 72 IN | OXYGEN SATURATION: 95 %

## 2021-11-10 DIAGNOSIS — Z23 ENCOUNTER FOR IMMUNIZATION: ICD-10-CM

## 2021-11-10 PROCEDURE — G0008: CPT

## 2021-11-10 PROCEDURE — G0442 ANNUAL ALCOHOL SCREEN 15 MIN: CPT | Mod: NC

## 2021-11-10 PROCEDURE — 93000 ELECTROCARDIOGRAM COMPLETE: CPT | Mod: 59

## 2021-11-10 PROCEDURE — 90686 IIV4 VACC NO PRSV 0.5 ML IM: CPT

## 2021-11-10 PROCEDURE — 36415 COLL VENOUS BLD VENIPUNCTURE: CPT

## 2021-11-10 PROCEDURE — 99396 PREV VISIT EST AGE 40-64: CPT | Mod: 25

## 2021-11-10 RX ORDER — EMTRICITABINE AND TENOFOVIR DISOPROXIL FUMARATE 200; 300 MG/1; MG/1
200-300 TABLET, FILM COATED ORAL DAILY
Qty: 90 | Refills: 2 | Status: DISCONTINUED | COMMUNITY
Start: 2021-02-04 | End: 2021-11-10

## 2021-11-10 RX ORDER — ATORVASTATIN CALCIUM 20 MG/1
20 TABLET, FILM COATED ORAL DAILY
Qty: 90 | Refills: 3 | Status: DISCONTINUED | COMMUNITY
Start: 2021-05-03 | End: 2021-11-10

## 2021-11-12 LAB
ALBUMIN SERPL ELPH-MCNC: 4.8 G/DL
ALP BLD-CCNC: 67 U/L
ALT SERPL-CCNC: 57 U/L
ANION GAP SERPL CALC-SCNC: 14 MMOL/L
APPEARANCE: CLEAR
AST SERPL-CCNC: 45 U/L
BACTERIA: NEGATIVE
BASOPHILS # BLD AUTO: 0.03 K/UL
BASOPHILS NFR BLD AUTO: 0.6 %
BILIRUB SERPL-MCNC: 0.4 MG/DL
BILIRUBIN URINE: NEGATIVE
BLOOD URINE: ABNORMAL
BUN SERPL-MCNC: 18 MG/DL
C TRACH RRNA SPEC QL NAA+PROBE: NOT DETECTED
CALCIUM SERPL-MCNC: 9.8 MG/DL
CHLORIDE SERPL-SCNC: 105 MMOL/L
CHOLEST SERPL-MCNC: 139 MG/DL
CO2 SERPL-SCNC: 24 MMOL/L
COLOR: NORMAL
CREAT SERPL-MCNC: 1.2 MG/DL
CREAT SPEC-SCNC: 143 MG/DL
CREAT/PROT UR: 0.1 RATIO
EOSINOPHIL # BLD AUTO: 0.23 K/UL
EOSINOPHIL NFR BLD AUTO: 4.8 %
ESTIMATED AVERAGE GLUCOSE: 120 MG/DL
GLUCOSE QUALITATIVE U: NEGATIVE
GLUCOSE SERPL-MCNC: 111 MG/DL
HBA1C MFR BLD HPLC: 5.8 %
HCT VFR BLD CALC: 46.1 %
HCV AB SER QL: NONREACTIVE
HCV S/CO RATIO: 0.14 S/CO
HDLC SERPL-MCNC: 39 MG/DL
HGB BLD-MCNC: 14.8 G/DL
HIV1+2 AB SPEC QL IA.RAPID: NONREACTIVE
HYALINE CASTS: 0 /LPF
IMM GRANULOCYTES NFR BLD AUTO: 0.2 %
KETONES URINE: NEGATIVE
LDLC SERPL CALC-MCNC: 76 MG/DL
LEUKOCYTE ESTERASE URINE: NEGATIVE
LYMPHOCYTES # BLD AUTO: 2.28 K/UL
LYMPHOCYTES NFR BLD AUTO: 47.4 %
MAN DIFF?: NORMAL
MCHC RBC-ENTMCNC: 26.9 PG
MCHC RBC-ENTMCNC: 32.1 GM/DL
MCV RBC AUTO: 83.8 FL
MICROSCOPIC-UA: NORMAL
MONOCYTES # BLD AUTO: 0.33 K/UL
MONOCYTES NFR BLD AUTO: 6.9 %
N GONORRHOEA RRNA SPEC QL NAA+PROBE: NOT DETECTED
NEUTROPHILS # BLD AUTO: 1.93 K/UL
NEUTROPHILS NFR BLD AUTO: 40.1 %
NITRITE URINE: NEGATIVE
NONHDLC SERPL-MCNC: 100 MG/DL
PH URINE: 6
PLATELET # BLD AUTO: 185 K/UL
POTASSIUM SERPL-SCNC: 3.7 MMOL/L
PROT SERPL-MCNC: 7.3 G/DL
PROT UR-MCNC: 12 MG/DL
PROTEIN URINE: NEGATIVE
PSA SERPL-MCNC: 0.66 NG/ML
RBC # BLD: 5.5 M/UL
RBC # FLD: 13.2 %
RED BLOOD CELLS URINE: 1 /HPF
SODIUM SERPL-SCNC: 144 MMOL/L
SOURCE AMPLIFICATION: NORMAL
SPECIFIC GRAVITY URINE: 1.02
SQUAMOUS EPITHELIAL CELLS: 0 /HPF
TRIGL SERPL-MCNC: 119 MG/DL
TSH SERPL-ACNC: 4.82 UIU/ML
UROBILINOGEN URINE: NORMAL
WBC # FLD AUTO: 4.81 K/UL
WHITE BLOOD CELLS URINE: 0 /HPF

## 2021-11-17 ENCOUNTER — APPOINTMENT (OUTPATIENT)
Dept: NEPHROLOGY | Facility: CLINIC | Age: 56
End: 2021-11-17
Payer: MEDICAID

## 2021-11-17 VITALS — SYSTOLIC BLOOD PRESSURE: 116 MMHG | HEART RATE: 81 BPM | DIASTOLIC BLOOD PRESSURE: 83 MMHG

## 2021-11-17 PROCEDURE — 99214 OFFICE O/P EST MOD 30 MIN: CPT

## 2021-11-17 NOTE — ASSESSMENT
[FreeTextEntry1] : # Fatty liver.\par * Fibroscan ordered.  \par * Counseling: advised to abstain from alcohol\par * Therapies: weight loss counseling, increased physical activity, blood glucose control, lipid lowering therapy (if needed)\par * Nondiagnostic liver fibrosis (FIB-4) index. Advised to make appointment for GI evaluation. They were instructed that this referral is important for their health and that it is their responsibility to make and keep this appointment. All their questions were answered. \par \par # HTN controlled. White coat component.\par * Continue losartan and chlorthalidone.\par * The patient's blood pressure was checked with the [employer] [id number]-[id number] using the SPRINT trial protocol after sitting quietly in an empty room with arm supported, back supported, and feet on the floor for 5 minutes. The average of 3 readings were taken.\par * A counseling information sheet has been given (currently or previously, in-person or electronically). All their questions were answered.\par * The patient has been counseled to check their BP at home with an automatic arm cuff, write down the readings, and reach me directly on the phone immediately if they are persistently > 180 systolic or if SBP is less than 100 or if lightheadedness develops. They were counseled to bring in all blood pressure readings and medications next visit.\par * The patient has been counseled that regular office followup (at least every 4 months for now) is important for monitoring and for their health, and that it is their responsibility to make follow up appointments.\par * The patient also has been counseled that they must never stop or change any medications without discussing this with me (or another physician). \par \par # CKD stage 3 improving.\par * Therapies for kidney disease: blood pressure control; other evidence-based therapies including exercise, a plant-based lower oxalate diet, and 400 mcg folic acid daily\par * Cardiovascular disease prevention: counseling on healthy diet, physical activity, weight loss, alcohol limitation, blood pressure control; moderate intensity statin therapy\par * The patient has been counseled that chronic kidney disease is a significant condition and regular office followup with me (at least every 4 months for now) is important for monitoring and their health, and that it is their responsibility to make a follow up appointment.\par * The patient has been counseled never to stop taking their medications without discussing it with me or another doctor.\par * The patient has been counseled on avoiding NSAIDs.\par * The patient has been counseled on risk of acute renal failure and instructed to immediately call and speak with me or go immediately to ER with any severe symptoms, nausea, vomiting, diarrhea, chest pain, or shortness of breath.\par * A counseling information sheet has been given (today or previously). All their questions were answered.\par  \par \par

## 2021-11-17 NOTE — HISTORY OF PRESENT ILLNESS
[FreeTextEntry1] : PCP: Dr. Shaniqua Briseno\par \par * Lipitor increased to 40. * No symptoms of kdiney stones. * Off truvada.* CKD stable, creatinine 1.2. AST/ALT abnormal due to fatty liver. Fib-4 1.7. * * HTN controlled. No lightheadedness. Compliant with medications. \par \par Previous history ([id number]): *  with Dr. Briseno. He hasn't checked BP at home. * No symptoms of kdiney stones. * Off truvada. * CKD improved, creatinine 1.19. No albuminuria. * AST/ALT abnormal. CT previously showed fatty liver. 1 drink or less daily. 5 drinks weekly. \par \par Previous history ([id number]): * HTN controlled. No lightheadedness. * No symptoms of kidneys stones. * CKD stable, creatinine 1.37. Off truvada. \par \par Previous history ([id number]): * CKD previously stable, creatinine 1.24. Off truvada. * HTN borderline controlled, 120s/80s. Increased stress today. * No symptoms of kidney stones. \par \par Previous history (30Jun20): * Covid AB ([facility] CMIA) negative. * CKD previously improved, creatinine 1.25. * No symptoms of kidney stones. * He has not rechecked BP at home, previously 120s/90s. * He has stopped truvada. \par \par Previous history (06May20): * On April 3 he developed fever and slight SOB. Diagnosed with probable COVID. Sx resolved completely 10days ago. * CKD creatinine, creatinine 1.48 on 2/18/20. * No symptoms of kidney stones. * He checked his BP only once this morning. BP 130s/90s. \par \par Previous history (09Apr19): * CKD stable, creatinine 1.26. * HTN controlled. No lightheadedness. Compliant with medications. * No symptoms of kidney stones. * Borderline DM improved. \par \par Previous history (07May19): • CKD stable, creatinine 1.2. No proteinuria or hematuria. •  HTN now controlled. No lightheadedness. *  Borderline DM improved. /92 standing in office. * Truvada, HCTZ, and losartan require monitoring. * No symptoms of kidney stones. * Borderline DM, HGA1c 5.6 (now improved).

## 2021-11-17 NOTE — PHYSICAL EXAM
[General Appearance - Alert] : alert [General Appearance - In No Acute Distress] : in no acute distress [Neck Appearance] : the appearance of the neck was normal [Neck Cervical Mass (___cm)] : no neck mass was observed [Jugular Venous Distention Increased] : there was no jugular-venous distention [Thyroid Diffuse Enlargement] : the thyroid was not enlarged [Thyroid Nodule] : there were no palpable thyroid nodules [Auscultation Breath Sounds / Voice Sounds] : lungs were clear to auscultation bilaterally [Heart Rate And Rhythm] : heart rate was normal and rhythm regular [Heart Sounds] : normal S1 and S2 [Heart Sounds Gallop] : no gallops [Murmurs] : no murmurs [Heart Sounds Pericardial Friction Rub] : no pericardial rub [Edema] : there was no peripheral edema [Veins - Varicosity Changes] : there were no varicosital changes [Bowel Sounds] : normal bowel sounds [Abdomen Soft] : soft [Abdomen Tenderness] : non-tender [] : no hepato-splenomegaly [Abdomen Mass (___ Cm)] : no abdominal mass palpated [Cervical Lymph Nodes Enlarged Posterior Bilaterally] : posterior cervical [Cervical Lymph Nodes Enlarged Anterior Bilaterally] : anterior cervical [Supraclavicular Lymph Nodes Enlarged Bilaterally] : supraclavicular

## 2021-11-23 ENCOUNTER — OUTPATIENT (OUTPATIENT)
Dept: OUTPATIENT SERVICES | Facility: HOSPITAL | Age: 56
LOS: 1 days | End: 2021-11-23
Payer: MEDICAID

## 2021-11-23 ENCOUNTER — APPOINTMENT (OUTPATIENT)
Dept: ULTRASOUND IMAGING | Facility: HOSPITAL | Age: 56
End: 2021-11-23
Payer: MEDICAID

## 2021-11-23 PROCEDURE — 76770 US EXAM ABDO BACK WALL COMP: CPT | Mod: 26

## 2021-11-23 PROCEDURE — 76770 US EXAM ABDO BACK WALL COMP: CPT

## 2021-11-30 ENCOUNTER — APPOINTMENT (OUTPATIENT)
Dept: GASTROENTEROLOGY | Facility: CLINIC | Age: 56
End: 2021-11-30
Payer: MEDICAID

## 2021-11-30 VITALS
OXYGEN SATURATION: 96 % | HEART RATE: 88 BPM | DIASTOLIC BLOOD PRESSURE: 80 MMHG | HEIGHT: 72 IN | WEIGHT: 255 LBS | BODY MASS INDEX: 34.54 KG/M2 | TEMPERATURE: 95.5 F | RESPIRATION RATE: 14 BRPM | SYSTOLIC BLOOD PRESSURE: 130 MMHG

## 2021-11-30 PROCEDURE — 99204 OFFICE O/P NEW MOD 45 MIN: CPT

## 2021-11-30 NOTE — PHYSICAL EXAM
[General Appearance - Alert] : alert [General Appearance - In No Acute Distress] : in no acute distress [General Appearance - Well Nourished] : well nourished [Sclera] : the sclera and conjunctiva were normal [PERRL With Normal Accommodation] : pupils were equal in size, round, and reactive to light [Extraocular Movements] : extraocular movements were intact [Hearing Threshold Finger Rub Not [place]] : hearing was normal [Examination Of The Oral Cavity] : the lips and gums were normal [Neck Cervical Mass (___cm)] : no neck mass was observed [Respiration, Rhythm And Depth] : normal respiratory rhythm and effort [Exaggerated Use Of Accessory Muscles For Inspiration] : no accessory muscle use [Heart Rate And Rhythm] : heart rate was normal and rhythm regular [Edema] : there was no peripheral edema [Veins - Varicosity Changes] : there were no varicosital changes [Bowel Sounds] : normal bowel sounds [Abdomen Soft] : soft [Abdomen Tenderness] : non-tender [Nail Clubbing] : no clubbing  or cyanosis of the fingernails [Involuntary Movements] : no involuntary movements were seen [] : no rash [Skin Lesions] : no skin lesions [No Focal Deficits] : no focal deficits [Oriented To Time, Place, And Person] : oriented to person, place, and time [Impaired Insight] : insight and judgment were intact [FreeTextEntry1] : Obese

## 2021-11-30 NOTE — ASSESSMENT
[FreeTextEntry1] : 56yM with Hx as above referred for evaluation of elevated LTs\par \par Elevated LTs - Etiology likely related to fatty liver given obesity, HLD, and US showing enlarged liver with heterogeneity and FibroScan showing severe steatosis.\par - Labs today to evaluate for autoimmune, infections, and infiltrative etiologies\par - Follow-up with PMD for hypothyroidism\par - OK to continue statin therapy at this time\par - Discussed lifestyle modification with diet, exercise, introduction of up to 4 cups of black coffee per day\par - Discussed intermittent fasting and other weight loss strategies\par - If no progress in 3 months, will refer for evaluation for medical weight loss therapy\par \par RTC in 3 months for repeat CMP\par Patient seen and discussed with attending physician

## 2021-11-30 NOTE — HISTORY OF PRESENT ILLNESS
[de-identified] : Patient is a 56yM with a PMHx of HTN, Hypothyroidism, HLD, Obesity who was referred by Dr. Fraire for evaluation of elevated liver tests. Review of labs showing intermittent elevation from 2019 with a period of normal values until after February 2020, when the patient states he fell victim to "Pandemic habits" and was eating more and exercising less. He underwent an US that showed increased heterogenicity suggestive of hepatic steatosis without focal mass or lesion. He underwent a follow-up Fibroscan which showed F0-1 and S3 - or severe steatosis. Patient otherwise feels well and without complaints. No abdominal pain or distention, no change in bowel habits. No periods of confusion, yellowing of the skin or eyes.

## 2021-12-01 LAB
A1AT SERPL-MCNC: 114 MG/DL
IGG SER QL IEP: 1263 MG/DL
INR PPP: 0.97 RATIO
MITOCHONDRIA AB SER IF-ACNC: NORMAL
PT BLD: 11.5 SEC
SMOOTH MUSCLE AB SER QL IF: NORMAL

## 2021-12-02 LAB
HAV IGM SER QL: NONREACTIVE
HBV CORE IGG+IGM SER QL: REACTIVE
HBV SURFACE AB SER QL: REACTIVE
HBV SURFACE AG SER QL: NONREACTIVE

## 2021-12-03 LAB
ANA SER IF-ACNC: NEGATIVE
LKM AB SER QL IF: <20.1 UNITS

## 2021-12-10 ENCOUNTER — RX RENEWAL (OUTPATIENT)
Age: 56
End: 2021-12-10

## 2022-01-03 ENCOUNTER — RX RENEWAL (OUTPATIENT)
Age: 57
End: 2022-01-03

## 2022-03-16 ENCOUNTER — RX RENEWAL (OUTPATIENT)
Age: 57
End: 2022-03-16

## 2022-03-29 ENCOUNTER — APPOINTMENT (OUTPATIENT)
Dept: GASTROENTEROLOGY | Facility: CLINIC | Age: 57
End: 2022-03-29

## 2022-03-31 ENCOUNTER — APPOINTMENT (OUTPATIENT)
Dept: NEPHROLOGY | Facility: CLINIC | Age: 57
End: 2022-03-31

## 2022-04-04 NOTE — HISTORY OF PRESENT ILLNESS
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 BP is good. 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 Also, he's concerned about low level depression which was significant over the winter, is better now; he'd like to try a medicine.  Has never before. Meds reviewed. No suicidal ideation.  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 Father ailing after CVA, in [place]. \par \par 
03-Apr-2022 15:30

## 2022-05-04 ENCOUNTER — APPOINTMENT (OUTPATIENT)
Dept: INTERNAL MEDICINE | Facility: CLINIC | Age: 57
End: 2022-05-04

## 2022-08-15 ENCOUNTER — RX RENEWAL (OUTPATIENT)
Age: 57
End: 2022-08-15

## 2022-09-12 ENCOUNTER — RX RENEWAL (OUTPATIENT)
Age: 57
End: 2022-09-12

## 2022-11-01 ENCOUNTER — RX RENEWAL (OUTPATIENT)
Age: 57
End: 2022-11-01

## 2022-11-04 ENCOUNTER — LABORATORY RESULT (OUTPATIENT)
Age: 57
End: 2022-11-04

## 2022-11-04 ENCOUNTER — APPOINTMENT (OUTPATIENT)
Dept: NEPHROLOGY | Facility: CLINIC | Age: 57
End: 2022-11-04

## 2022-11-04 VITALS — DIASTOLIC BLOOD PRESSURE: 84 MMHG | HEART RATE: 82 BPM | SYSTOLIC BLOOD PRESSURE: 118 MMHG

## 2022-11-04 DIAGNOSIS — Z11.3 ENCOUNTER FOR SCREENING FOR INFECTIONS WITH A PREDOMINANTLY SEXUAL MODE OF TRANSMISSION: ICD-10-CM

## 2022-11-04 DIAGNOSIS — K76.0 FATTY (CHANGE OF) LIVER, NOT ELSEWHERE CLASSIFIED: ICD-10-CM

## 2022-11-04 PROCEDURE — 99214 OFFICE O/P EST MOD 30 MIN: CPT | Mod: 25

## 2022-11-04 PROCEDURE — 36415 COLL VENOUS BLD VENIPUNCTURE: CPT

## 2022-11-04 RX ORDER — HYDROCHLOROTHIAZIDE 12.5 MG/1
12.5 CAPSULE ORAL DAILY
Qty: 90 | Refills: 3 | Status: DISCONTINUED | COMMUNITY
Start: 2017-06-30 | End: 2022-11-04

## 2022-11-04 NOTE — ASSESSMENT
[FreeTextEntry1] : # CKD stage 3 stable/improved.\par * Recheck labs.\par * Absolute kidney benefits/risks of SGLT2i in this clinical situation are uncertain. \par * Therapies for kidney disease: blood pressure control; proteinuria reduction with ARB/ACEi; other evidence-based therapies including exercise, a plant-based lower oxalate diet, and 400 mcg folic acid daily\par * Cardiovascular disease prevention: counseling on healthy diet, physical activity, weight loss, alcohol limitation, blood pressure control; statin therapy\par * A counseling information sheet on CKD which they have been instructed to read has been given.\par * The patient has been counseled that chronic kidney disease is a significant condition and regular office follow-up with me (at least every 4 months for now) is important for monitoring and their health, and that it is their responsibility to make a follow-up appointment.\par * The patient has been counseled never to stop taking their medications without discussing it with me or another doctor.\par * The patient has been counseled on avoiding NSAIDs.\par * The patient has been counseled on risk of worsening kidney function and instructed to immediately call and speak with me and go immediately to ER with any severe symptoms, nausea, vomiting, diarrhea, chest pain, or shortness of breath.\par \par # HTN controlled.\par * Cont chlorthaldione, losartan. \par * The patient's blood pressure was checked with the [employer] [id number]-907XL using the SPRINT trial protocol after sitting quietly in an empty room with arm supported, back supported, and feet on the floor for 5 minutes. The average of 3 readings were taken.\par * A counseling information sheet on blood pressure and staying healthy which they have been instructed to read has been given.\par * The patient has been counseled to check their BP at home with an automatic arm cuff, write down the readings, and reach me directly on the phone immediately if they are persistently > 180 systolic or if SBP is less than 100 or if lightheadedness develops. They were counseled to bring in all blood pressure readings and medications next visit.\par * The patient has been counseled that regular office follow-up (at least every 4 months for now)  is important for monitoring and for their health, and that it is their responsibility to make follow up appointments.\par * The patient also has been counseled that they must never stop or change any medications without discussing this with me (or another physician). \par \par # Obesity.\par * Weight loss. \par \par # Fatty liver.\par * Follow up with GI, follow lover tests.

## 2022-11-04 NOTE — HISTORY OF PRESENT ILLNESS
[FreeTextEntry1] : PCP: Dr. Shaniqua Briseno\par \par * Followed up with GI for fatty liver. * CKD previously stable, creatinine 1.2. * * HTN controlled. No lightheadedness. No CP/SOB. Compliant with medications. * Off truvada. \par \par Previous history (17Nov21): * Lipitor increased to 40. * No symptoms of kdiney stones. * Off truvada.* CKD stable, creatinine 1.2. AST/ALT abnormal due to fatty liver. Fib-4 1.7. * * HTN controlled. No lightheadedness. Compliant with medications. \par \par Previous history (06Bej29): *  with Dr. Briseno. He hasn't checked BP at home. * No symptoms of kdiney stones. * Off truvada. * CKD improved, creatinine 1.19. No albuminuria. * AST/ALT abnormal. CT previously showed fatty liver. 1 drink or less daily. 5 drinks weekly. \par \par Previous history (83Zms04): * HTN controlled. No lightheadedness. * No symptoms of kidneys stones. * CKD stable, creatinine 1.37. Off truvada. \par \par Previous history (25Aug20): * CKD previously stable, creatinine 1.24. Off truvada. * HTN borderline controlled, 120s/80s. Increased stress today. * No symptoms of kidney stones. \par \par Previous history (30Jun20): * Covid AB ([facility] CMIA) negative. * CKD previously improved, creatinine 1.25. * No symptoms of kidney stones. * He has not rechecked BP at home, previously 120s/90s. * He has stopped truvada. \par \par Previous history (06May20): * On April 3 he developed fever and slight SOB. Diagnosed with probable COVID. Sx resolved completely 10days ago. * CKD creatinine, creatinine 1.48 on 2/18/20. * No symptoms of kidney stones. * He checked his BP only once this morning. BP 130s/90s. \par \par Previous history (09Apr19): * CKD stable, creatinine 1.26. * HTN controlled. No lightheadedness. Compliant with medications. * No symptoms of kidney stones. * Borderline DM improved. \par \par Previous history (07May19): • CKD stable, creatinine 1.2. No proteinuria or hematuria. •  HTN now controlled. No lightheadedness. *  Borderline DM improved. /92 standing in office. * Truvada, HCTZ, and losartan require monitoring. * No symptoms of kidney stones. * Borderline DM, HGA1c 5.6 (now improved).

## 2022-11-05 LAB
25(OH)D3 SERPL-MCNC: 31.7 NG/ML
ALBUMIN SERPL ELPH-MCNC: 5.1 G/DL
ALP BLD-CCNC: 66 U/L
ALT SERPL-CCNC: 83 U/L
ANION GAP SERPL CALC-SCNC: 16 MMOL/L
APPEARANCE: CLEAR
AST SERPL-CCNC: 54 U/L
BASOPHILS # BLD AUTO: 0.03 K/UL
BASOPHILS NFR BLD AUTO: 0.7 %
BILIRUB SERPL-MCNC: 0.7 MG/DL
BILIRUBIN URINE: NEGATIVE
BLOOD URINE: ABNORMAL
BUN SERPL-MCNC: 14 MG/DL
C TRACH RRNA SPEC QL NAA+PROBE: NOT DETECTED
CALCIUM SERPL-MCNC: 10.1 MG/DL
CALCIUM SERPL-MCNC: 10.1 MG/DL
CHLORIDE SERPL-SCNC: 101 MMOL/L
CHOLEST SERPL-MCNC: 176 MG/DL
CO2 SERPL-SCNC: 23 MMOL/L
COLOR: NORMAL
CREAT SERPL-MCNC: 1.06 MG/DL
CREAT SPEC-SCNC: 124 MG/DL
CREAT SPEC-SCNC: 124 MG/DL
CREAT/PROT UR: 0.1 RATIO
CYSTATIN C SERPL-MCNC: 0.81 MG/L
EGFR: 82 ML/MIN/1.73M2
EOSINOPHIL # BLD AUTO: 0.07 K/UL
EOSINOPHIL NFR BLD AUTO: 1.6 %
ESTIMATED AVERAGE GLUCOSE: 128 MG/DL
FERRITIN SERPL-MCNC: 658 NG/ML
GFR/BSA.PRED SERPLBLD CYS-BASED-ARV: 105 ML/MIN/1.73M2
GLUCOSE QUALITATIVE U: NEGATIVE
GLUCOSE SERPL-MCNC: 102 MG/DL
HBA1C MFR BLD HPLC: 6.1 %
HBV SURFACE AG SER QL: NONREACTIVE
HCT VFR BLD CALC: 47 %
HCV AB SER QL: NONREACTIVE
HCV S/CO RATIO: 0.09 S/CO
HDLC SERPL-MCNC: 45 MG/DL
HGB BLD-MCNC: 16.1 G/DL
HIV1+2 AB SPEC QL IA.RAPID: NONREACTIVE
IMM GRANULOCYTES NFR BLD AUTO: 0.2 %
KETONES URINE: NEGATIVE
LDLC SERPL CALC-MCNC: 101 MG/DL
LEUKOCYTE ESTERASE URINE: NEGATIVE
LYMPHOCYTES # BLD AUTO: 1.87 K/UL
LYMPHOCYTES NFR BLD AUTO: 42.2 %
MAGNESIUM SERPL-MCNC: 2.1 MG/DL
MAN DIFF?: NORMAL
MCHC RBC-ENTMCNC: 27.2 PG
MCHC RBC-ENTMCNC: 34.3 GM/DL
MCV RBC AUTO: 79.5 FL
MICROALBUMIN 24H UR DL<=1MG/L-MCNC: 2.3 MG/DL
MICROALBUMIN/CREAT 24H UR-RTO: 18 MG/G
MONOCYTES # BLD AUTO: 0.43 K/UL
MONOCYTES NFR BLD AUTO: 9.7 %
N GONORRHOEA RRNA SPEC QL NAA+PROBE: NOT DETECTED
NEUTROPHILS # BLD AUTO: 2.02 K/UL
NEUTROPHILS NFR BLD AUTO: 45.6 %
NITRITE URINE: NEGATIVE
NONHDLC SERPL-MCNC: 130 MG/DL
PARATHYROID HORMONE INTACT: 20 PG/ML
PH URINE: 6
PHOSPHATE SERPL-MCNC: 3.3 MG/DL
PLATELET # BLD AUTO: 157 K/UL
POTASSIUM SERPL-SCNC: 3.6 MMOL/L
PROT SERPL-MCNC: 8 G/DL
PROT UR-MCNC: 17 MG/DL
PROTEIN URINE: NEGATIVE
RBC # BLD: 5.91 M/UL
RBC # FLD: 13.8 %
SODIUM SERPL-SCNC: 139 MMOL/L
SOURCE AMPLIFICATION: NORMAL
SPECIFIC GRAVITY URINE: 1.02
TRIGL SERPL-MCNC: 146 MG/DL
TSH SERPL-ACNC: 3.18 UIU/ML
UROBILINOGEN URINE: NORMAL
VIT B12 SERPL-MCNC: 551 PG/ML
WBC # FLD AUTO: 4.43 K/UL

## 2022-11-09 LAB — T PALLIDUM AB SER QL IA: POSITIVE

## 2022-11-22 ENCOUNTER — APPOINTMENT (OUTPATIENT)
Dept: INTERNAL MEDICINE | Facility: CLINIC | Age: 57
End: 2022-11-22

## 2022-11-22 ENCOUNTER — NON-APPOINTMENT (OUTPATIENT)
Age: 57
End: 2022-11-22

## 2022-11-22 ENCOUNTER — RESULT REVIEW (OUTPATIENT)
Age: 57
End: 2022-11-22

## 2022-11-22 VITALS
BODY MASS INDEX: 35.53 KG/M2 | DIASTOLIC BLOOD PRESSURE: 84 MMHG | SYSTOLIC BLOOD PRESSURE: 122 MMHG | HEART RATE: 89 BPM | WEIGHT: 262 LBS | TEMPERATURE: 97 F | OXYGEN SATURATION: 97 %

## 2022-11-22 DIAGNOSIS — M25.561 PAIN IN RIGHT KNEE: ICD-10-CM

## 2022-11-22 DIAGNOSIS — M25.562 PAIN IN RIGHT KNEE: ICD-10-CM

## 2022-11-22 PROCEDURE — 99213 OFFICE O/P EST LOW 20 MIN: CPT | Mod: 25

## 2022-11-22 PROCEDURE — 99396 PREV VISIT EST AGE 40-64: CPT | Mod: 25

## 2022-11-22 PROCEDURE — 93000 ELECTROCARDIOGRAM COMPLETE: CPT

## 2022-11-22 NOTE — HEALTH RISK ASSESSMENT
[Never] : Never [Yes] : Yes [2 - 3 times a week (3 pts)] : 2 - 3  times a week (3 points) [1 or 2 (0 pts)] : 1 or 2 (0 points) [Never (0 pts)] : Never (0 points) [No] : In the past 12 months have you used drugs other than those required for medical reasons? No [0] : 2) Feeling down, depressed, or hopeless: Not at all (0) [PHQ-2 Negative - No further assessment needed] : PHQ-2 Negative - No further assessment needed [Fully functional (bathing, dressing, toileting, transferring, walking, feeding)] : Fully functional (bathing, dressing, toileting, transferring, walking, feeding) [Fully functional (using the telephone, shopping, preparing meals, housekeeping, doing laundry, using] : Fully functional and needs no help or supervision to perform IADLs (using the telephone, shopping, preparing meals, housekeeping, doing laundry, using transportation, managing medications and managing finances) [Audit-CScore] : 3 [de-identified] : walking 10k steps 3x per week  [de-identified] : mostly healthy diet  [IRO3Acmnh] : 0 [Reports changes in hearing] : Reports no changes in hearing [Reports changes in vision] : Reports no changes in vision [Reports changes in dental health] : Reports no changes in dental health

## 2022-11-22 NOTE — HISTORY OF PRESENT ILLNESS
[de-identified] : Mr. ELISEO KHAN is a 57 year old male with history of Hypothyroidism, HTN, HLD, CKD, Fatty liver and obesity presenting today for physical. He has recently been able to lose 10 lbs recently. Endorses concerns about knee pain for the past 6 or so months. R knee worse than left. No injury to his knees or joint swelling. Occasionally has knee clicking. Worse with kneeling down or standing up.  Recent visit with Dr. Fraire for CKD. Last saw Dr. Torrez 1y/a for NAFL. Was recommended to have HepB PCR and Hep D labs, but did not complete test and reports being told there was nothing else he needed to do.

## 2022-11-22 NOTE — ASSESSMENT
[FreeTextEntry1] : #HCM\par - routine labs with Dr. Fraire reviewed; LFTs remain elevated and A1c 6.1 \par - discussed healthy diet and exercise; has managed to recently lost ~10 lbs. DIscussed working with dietician or medications to assist with weight loss. Will follow up in 3 months and reassess \par - reviewed age appropriate vaccines and cancer screenings

## 2022-11-23 ENCOUNTER — OUTPATIENT (OUTPATIENT)
Dept: OUTPATIENT SERVICES | Facility: HOSPITAL | Age: 57
LOS: 1 days | End: 2022-11-23
Payer: COMMERCIAL

## 2022-11-23 ENCOUNTER — RESULT CHARGE (OUTPATIENT)
Age: 57
End: 2022-11-23

## 2022-11-23 PROCEDURE — 73564 X-RAY EXAM KNEE 4 OR MORE: CPT

## 2022-11-23 PROCEDURE — 73564 X-RAY EXAM KNEE 4 OR MORE: CPT | Mod: 26,50

## 2022-11-24 DIAGNOSIS — N18.30 CHRONIC KIDNEY DISEASE, STAGE 3 UNSPECIFIED: ICD-10-CM

## 2022-11-28 LAB
HEPB DNA PCR INT: NOT DETECTED
HEPB DNA PCR LOG: NOT DETECTED LOGIU/ML

## 2022-12-05 LAB — HDV AB SER IA-ACNC: NEGATIVE

## 2023-03-08 ENCOUNTER — APPOINTMENT (OUTPATIENT)
Dept: NEPHROLOGY | Facility: CLINIC | Age: 58
End: 2023-03-08

## 2023-03-22 ENCOUNTER — APPOINTMENT (OUTPATIENT)
Dept: ORTHOPEDIC SURGERY | Facility: CLINIC | Age: 58
End: 2023-03-22

## 2023-03-22 ENCOUNTER — APPOINTMENT (OUTPATIENT)
Dept: INTERNAL MEDICINE | Facility: CLINIC | Age: 58
End: 2023-03-22
Payer: MEDICAID

## 2023-03-22 VITALS
WEIGHT: 259 LBS | HEART RATE: 92 BPM | OXYGEN SATURATION: 95 % | BODY MASS INDEX: 35.13 KG/M2 | DIASTOLIC BLOOD PRESSURE: 90 MMHG | TEMPERATURE: 96 F | SYSTOLIC BLOOD PRESSURE: 129 MMHG

## 2023-03-22 VITALS — DIASTOLIC BLOOD PRESSURE: 88 MMHG | SYSTOLIC BLOOD PRESSURE: 120 MMHG

## 2023-03-22 PROCEDURE — 99214 OFFICE O/P EST MOD 30 MIN: CPT

## 2023-03-22 NOTE — HISTORY OF PRESENT ILLNESS
[de-identified] : Mr. ELISEO KHAN is a 58 year old male with history of Hypothyroidism, HTN, HLD, CKD, Fatty liver and obesity presenting today for follow up. He has been working on increased exercise and healthy diet.

## 2023-03-27 ENCOUNTER — APPOINTMENT (OUTPATIENT)
Dept: NEPHROLOGY | Facility: CLINIC | Age: 58
End: 2023-03-27
Payer: MEDICAID

## 2023-03-27 VITALS — DIASTOLIC BLOOD PRESSURE: 88 MMHG | HEART RATE: 87 BPM | SYSTOLIC BLOOD PRESSURE: 117 MMHG

## 2023-03-27 PROCEDURE — 36415 COLL VENOUS BLD VENIPUNCTURE: CPT

## 2023-03-27 PROCEDURE — 99214 OFFICE O/P EST MOD 30 MIN: CPT | Mod: 25

## 2023-03-27 NOTE — HISTORY OF PRESENT ILLNESS
[FreeTextEntry1] : PCP: Dr. Shaniqua Briseno\par \par * eGFR 92 based on cystatin C of 0.81 (EK eGFRcys equation, [facility] 2023). * HTN controlled. No lightheadedness. No CP/SOB. Compliant with medications.  * Following up with GI for fatty liver. AST/ALT 50 - 80s. \par * Lost 19 pounds. Weight decreased to 258. \par \par Previous history (04Nov22): * Followed up with GI for fatty liver. * CKD previously stable, creatinine 1.2. * * HTN controlled. No lightheadedness. No CP/SOB. Compliant with medications. * Off truvada. \par \par Previous history (17Nov21): * Lipitor increased to 40. * No symptoms of kdiney stones. * Off truvada.* CKD stable, creatinine 1.2. AST/ALT abnormal due to fatty liver. Fib-4 1.7. * * HTN controlled. No lightheadedness. Compliant with medications. \par \par Previous history (02Jul21): *  with Dr. Briseno. He hasn't checked BP at home. * No symptoms of kdiney stones. * Off truvada. * CKD improved, creatinine 1.19. No albuminuria. * AST/ALT abnormal. CT previously showed fatty liver. 1 drink or less daily. 5 drinks weekly. \par \par Previous history (15Dec20): * HTN controlled. No lightheadedness. * No symptoms of kidneys stones. * CKD stable, creatinine 1.37. Off truvada. \par \par Previous history (25Aug20): * CKD previously stable, creatinine 1.24. Off truvada. * HTN borderline controlled, 120s/80s. Increased stress today. * No symptoms of kidney stones. \par \par Previous history (30Jun20): * Covid AB ([facility] CMIA) negative. * CKD previously improved, creatinine 1.25. * No symptoms of kidney stones. * He has not rechecked BP at home, previously 120s/90s. * He has stopped truvada. \par \par Previous history (06May20): * On April 3 he developed fever and slight SOB. Diagnosed with probable COVID. Sx resolved completely 10days ago. * CKD creatinine, creatinine 1.48 on 2/18/20. * No symptoms of kidney stones. * He checked his BP only once this morning. BP 130s/90s. \par \par Previous history (09Apr19): * CKD stable, creatinine 1.26. * HTN controlled. No lightheadedness. Compliant with medications. * No symptoms of kidney stones. * Borderline DM improved. \par \par Previous history (07May19): • CKD stable, creatinine 1.2. No proteinuria or hematuria. •  HTN now controlled. No lightheadedness. *  Borderline DM improved. /92 standing in office. * Truvada, HCTZ, and losartan require monitoring. * No symptoms of kidney stones. * Borderline DM, HGA1c 5.6 (now improved).

## 2023-03-29 LAB
25(OH)D3 SERPL-MCNC: 34.5 NG/ML
ABO + RH PNL BLD: NORMAL
ALBUMIN SERPL ELPH-MCNC: 5.4 G/DL
ALP BLD-CCNC: 54 U/L
ALT SERPL-CCNC: 60 U/L
ANION GAP SERPL CALC-SCNC: 16 MMOL/L
APPEARANCE: CLEAR
AST SERPL-CCNC: 43 U/L
BASOPHILS # BLD AUTO: 0.03 K/UL
BASOPHILS NFR BLD AUTO: 0.5 %
BILIRUB SERPL-MCNC: 0.9 MG/DL
BILIRUBIN URINE: NEGATIVE
BLOOD URINE: NEGATIVE
BUN SERPL-MCNC: 14 MG/DL
CALCIUM SERPL-MCNC: 10.3 MG/DL
CALCIUM SERPL-MCNC: 10.3 MG/DL
CHLORIDE SERPL-SCNC: 101 MMOL/L
CHOLEST SERPL-MCNC: 161 MG/DL
CO2 SERPL-SCNC: 25 MMOL/L
COLOR: YELLOW
CREAT SERPL-MCNC: 1.15 MG/DL
CREAT SPEC-SCNC: 196 MG/DL
CREAT SPEC-SCNC: 196 MG/DL
CREAT/PROT UR: 0.1 RATIO
CYSTATIN C SERPL-MCNC: 0.8 MG/L
EGFR: 74 ML/MIN/1.73M2
EOSINOPHIL # BLD AUTO: 0.01 K/UL
EOSINOPHIL NFR BLD AUTO: 0.2 %
ESTIMATED AVERAGE GLUCOSE: 120 MG/DL
FERRITIN SERPL-MCNC: 638 NG/ML
GFR/BSA.PRED SERPLBLD CYS-BASED-ARV: 106 ML/MIN/1.73M2
GLUCOSE QUALITATIVE U: NEGATIVE
GLUCOSE SERPL-MCNC: 100 MG/DL
HBA1C MFR BLD HPLC: 5.8 %
HCT VFR BLD CALC: 46.6 %
HDLC SERPL-MCNC: 45 MG/DL
HGB BLD-MCNC: 15.6 G/DL
IMM GRANULOCYTES NFR BLD AUTO: 0.2 %
KETONES URINE: NEGATIVE
LDLC SERPL CALC-MCNC: 94 MG/DL
LEUKOCYTE ESTERASE URINE: NEGATIVE
LYMPHOCYTES # BLD AUTO: 1.85 K/UL
LYMPHOCYTES NFR BLD AUTO: 31.6 %
MAGNESIUM SERPL-MCNC: 2.2 MG/DL
MAN DIFF?: NORMAL
MCHC RBC-ENTMCNC: 26.9 PG
MCHC RBC-ENTMCNC: 33.5 GM/DL
MCV RBC AUTO: 80.5 FL
MICROALBUMIN 24H UR DL<=1MG/L-MCNC: 3.5 MG/DL
MICROALBUMIN/CREAT 24H UR-RTO: 18 MG/G
MONOCYTES # BLD AUTO: 0.36 K/UL
MONOCYTES NFR BLD AUTO: 6.1 %
NEUTROPHILS # BLD AUTO: 3.6 K/UL
NEUTROPHILS NFR BLD AUTO: 61.4 %
NITRITE URINE: NEGATIVE
NONHDLC SERPL-MCNC: 117 MG/DL
PARATHYROID HORMONE INTACT: 21 PG/ML
PH URINE: 6
PHOSPHATE SERPL-MCNC: 2.7 MG/DL
PLATELET # BLD AUTO: 212 K/UL
POTASSIUM SERPL-SCNC: 3.7 MMOL/L
PROT SERPL-MCNC: 7.6 G/DL
PROT UR-MCNC: 13 MG/DL
PROTEIN URINE: NORMAL
RBC # BLD: 5.79 M/UL
RBC # FLD: 13 %
SODIUM SERPL-SCNC: 141 MMOL/L
SPECIFIC GRAVITY URINE: 1.02
TRIGL SERPL-MCNC: 115 MG/DL
TSH SERPL-ACNC: 2.69 UIU/ML
UROBILINOGEN URINE: NORMAL
VIT B12 SERPL-MCNC: 619 PG/ML
WBC # FLD AUTO: 5.86 K/UL

## 2023-06-12 ENCOUNTER — RX RENEWAL (OUTPATIENT)
Age: 58
End: 2023-06-12

## 2023-08-01 ENCOUNTER — APPOINTMENT (OUTPATIENT)
Dept: NEPHROLOGY | Facility: CLINIC | Age: 58
End: 2023-08-01
Payer: MEDICAID

## 2023-08-01 VITALS — BODY MASS INDEX: 34.04 KG/M2 | WEIGHT: 251 LBS

## 2023-08-01 VITALS — SYSTOLIC BLOOD PRESSURE: 120 MMHG | DIASTOLIC BLOOD PRESSURE: 83 MMHG | HEART RATE: 88 BPM

## 2023-08-01 PROCEDURE — 36415 COLL VENOUS BLD VENIPUNCTURE: CPT

## 2023-08-01 PROCEDURE — 99214 OFFICE O/P EST MOD 30 MIN: CPT | Mod: 25

## 2023-08-01 NOTE — HISTORY OF PRESENT ILLNESS
[FreeTextEntry1] : PCP: Dr. Shaniqua Briseno  * eGFR nl. No albuminuria last visit.  Borderline CKD improved.  * BP controlled.  No lightheadedness. No CP/SOB. Compliant with medications. * Following up with GI for fatty liver.  * No renal colic. * Stable obesity. * Doesn't check BP at home.  No lightheadedness. No CP/SOB. Compliant with medications. *   Previous history (27Mar23): * eGFR 92 based on cystatin C of 0.81 ([facility] eGFRcys equation, [facility] 2023). * HTN controlled. No lightheadedness. No CP/SOB. Compliant with medications.  * Following up with GI for fatty liver. AST/ALT 50 - 80s.  * Lost 19 pounds. Weight decreased to 258.   Previous history (04Nov22): * Followed up with GI for fatty liver. * CKD previously stable, creatinine 1.2. * * HTN controlled. No lightheadedness. No CP/SOB. Compliant with medications. * Off truvada.   Previous history (17Nov21): * Lipitor increased to 40. * No symptoms of kdiney stones. * Off truvada.* CKD stable, creatinine 1.2. AST/ALT abnormal due to fatty liver. Fib-4 1.7. * * HTN controlled. No lightheadedness. Compliant with medications.   Previous history (02Jul21): *  with Dr. Briseno. He hasn't checked BP at home. * No symptoms of kdiney stones. * Off truvada. * CKD improved, creatinine 1.19. No albuminuria. * AST/ALT abnormal. CT previously showed fatty liver. 1 drink or less daily. 5 drinks weekly.   Previous history (15Dec20): * HTN controlled. No lightheadedness. * No symptoms of kidneys stones. * CKD stable, creatinine 1.37. Off truvada.   Previous history (25Aug20): * CKD previously stable, creatinine 1.24. Off truvada. * HTN borderline controlled, 120s/80s. Increased stress today. * No symptoms of kidney stones.   Previous history (30Jun20): * Covid AB ([facility] CMIA) negative. * CKD previously improved, creatinine 1.25. * No symptoms of kidney stones. * He has not rechecked BP at home, previously 120s/90s. * He has stopped truvada.   Previous history (06May20): * On April 3 he developed fever and slight SOB. Diagnosed with probable COVID. Sx resolved completely 10days ago. * CKD creatinine, creatinine 1.48 on 2/18/20. * No symptoms of kidney stones. * He checked his BP only once this morning. BP 130s/90s.   Previous history (09Apr19): * CKD stable, creatinine 1.26. * HTN controlled. No lightheadedness. Compliant with medications. * No symptoms of kidney stones. * Borderline DM improved.   Previous history (07May19): - CKD stable, creatinine 1.2. No proteinuria or hematuria. -  HTN now controlled. No lightheadedness. *  Borderline DM improved. /92 standing in office. * Truvada, HCTZ, and losartan require monitoring. * No symptoms of kidney stones. * Borderline DM, HGA1c 5.6 (now improved).

## 2023-08-01 NOTE — ASSESSMENT
[FreeTextEntry1] : # CKD stage 3 stable/improved.  * Recheck labs. * Absolute kidney benefits/risks of SGLT2i in this clinical situation are uncertain. * Therapies for kidney disease: blood pressure control; proteinuria reduction with ARB/ACEi; other evidence-based therapies including exercise, a plant-based lower oxalate diet, and 400 mcg folic acid daily * Cardiovascular disease prevention: counseling on healthy diet, physical activity, weight loss, alcohol limitation, blood pressure control; statin therapy * A counseling information sheet on CKD which they have been instructed to read has been given. * The patient has been counseled that chronic kidney disease is a significant condition and regular office follow-up with me (at least every 4 months for now) is important for monitoring and their health, and that it is their responsibility to make a follow-up appointment. * The patient has been counseled never to stop taking their medications without discussing it with me or another doctor. * The patient has been counseled on avoiding NSAIDs. * The patient has been counseled on risk of worsening kidney function and instructed to immediately call and speak with me and go immediately to ER with any severe symptoms, nausea, vomiting, diarrhea, chest pain, or shortness of breath.  # HTN controlled. * Cont chlorthaldione, losartan. * The patient's blood pressure was checked with the [employer] [id number]-907XL using the SPRINT trial protocol after sitting quietly in an empty room with arm supported, back supported, and feet on the floor for 5 minutes. The average of 3 readings were taken. * A counseling information sheet on blood pressure and staying healthy which they have been instructed to read has been given. * The patient has been counseled to check their BP at home with an automatic arm cuff, write down the readings, and reach me directly on the phone immediately if they are persistently > 180 systolic or if SBP is less than 100 or if lightheadedness develops. They were counseled to bring in all blood pressure readings and medications next visit. * The patient has been counseled that regular office follow-up (at least every 4 months for now) is important for monitoring and for their health, and that it is their responsibility to make follow up appointments. * The patient also has been counseled that they must never stop or change any medications without discussing this with me (or another physician).  # Obesity. * Weight loss.  # Fatty liver. * Follow up with GI, follow liver tests.

## 2023-08-02 ENCOUNTER — RX RENEWAL (OUTPATIENT)
Age: 58
End: 2023-08-02

## 2023-08-05 LAB
25(OH)D3 SERPL-MCNC: 25.6 NG/ML
ALBUMIN SERPL ELPH-MCNC: 5 G/DL
ALP BLD-CCNC: 65 U/L
ALT SERPL-CCNC: 62 U/L
ANION GAP SERPL CALC-SCNC: 16 MMOL/L
APPEARANCE: CLEAR
AST SERPL-CCNC: 41 U/L
BILIRUB SERPL-MCNC: 0.6 MG/DL
BILIRUBIN URINE: NEGATIVE
BLOOD URINE: NEGATIVE
BUN SERPL-MCNC: 14 MG/DL
CALCIUM SERPL-MCNC: 10 MG/DL
CALCIUM SERPL-MCNC: 10 MG/DL
CHLORIDE SERPL-SCNC: 105 MMOL/L
CHOLEST SERPL-MCNC: 167 MG/DL
CO2 SERPL-SCNC: 22 MMOL/L
COLOR: YELLOW
CREAT SERPL-MCNC: 1.12 MG/DL
CREAT SPEC-SCNC: 157 MG/DL
CREAT SPEC-SCNC: 157 MG/DL
CREAT/PROT UR: 0.1 RATIO
CYSTATIN C SERPL-MCNC: 0.81 MG/L
EGFR: 77 ML/MIN/1.73M2
ESTIMATED AVERAGE GLUCOSE: 123 MG/DL
FERRITIN SERPL-MCNC: 517 NG/ML
GFR/BSA.PRED SERPLBLD CYS-BASED-ARV: 104 ML/MIN/1.73M2
GLUCOSE QUALITATIVE U: NEGATIVE MG/DL
GLUCOSE SERPL-MCNC: 97 MG/DL
HBA1C MFR BLD HPLC: 5.9 %
HDLC SERPL-MCNC: 46 MG/DL
KETONES URINE: NEGATIVE MG/DL
LDLC SERPL CALC-MCNC: 89 MG/DL
LEUKOCYTE ESTERASE URINE: NEGATIVE
MAGNESIUM SERPL-MCNC: 2.1 MG/DL
MICROALBUMIN 24H UR DL<=1MG/L-MCNC: 1.9 MG/DL
MICROALBUMIN/CREAT 24H UR-RTO: 12 MG/G
NITRITE URINE: NEGATIVE
NONHDLC SERPL-MCNC: 121 MG/DL
PARATHYROID HORMONE INTACT: 19 PG/ML
PH URINE: 5.5
PHOSPHATE SERPL-MCNC: 3.1 MG/DL
POTASSIUM SERPL-SCNC: 3.6 MMOL/L
PROT SERPL-MCNC: 7.5 G/DL
PROT UR-MCNC: 10 MG/DL
PROTEIN URINE: NEGATIVE MG/DL
SODIUM SERPL-SCNC: 143 MMOL/L
SPECIFIC GRAVITY URINE: 1.02
TRIGL SERPL-MCNC: 189 MG/DL
TSH SERPL-ACNC: 3.55 UIU/ML
UROBILINOGEN URINE: 0.2 MG/DL
VIT B12 SERPL-MCNC: 538 PG/ML

## 2023-08-15 ENCOUNTER — APPOINTMENT (OUTPATIENT)
Dept: HEPATOLOGY | Facility: CLINIC | Age: 58
End: 2023-08-15
Payer: MEDICAID

## 2023-08-15 VITALS
HEIGHT: 72 IN | DIASTOLIC BLOOD PRESSURE: 86 MMHG | RESPIRATION RATE: 14 BRPM | HEART RATE: 96 BPM | SYSTOLIC BLOOD PRESSURE: 129 MMHG | TEMPERATURE: 97.5 F | BODY MASS INDEX: 34.27 KG/M2 | WEIGHT: 253 LBS | OXYGEN SATURATION: 97 %

## 2023-08-15 DIAGNOSIS — R76.8 OTHER SPECIFIED ABNORMAL IMMUNOLOGICAL FINDINGS IN SERUM: ICD-10-CM

## 2023-08-15 PROCEDURE — 99213 OFFICE O/P EST LOW 20 MIN: CPT

## 2023-08-18 NOTE — REVIEW OF SYSTEMS
[Fever] : no fever [Chills] : no chills [Feeling Tired] : not feeling tired [Eye Pain] : no eye pain [Earache] : no earache [Chest Pain] : no chest pain [Palpitations] : no palpitations [Shortness Of Breath] : no shortness of breath [Abdominal Pain] : no abdominal pain [Vomiting] : no vomiting [Constipation] : no constipation [Diarrhea] : no diarrhea [Heartburn] : no heartburn [Melena] : no melena [Limb Swelling] : no limb swelling [Itching] : no itching [Dizziness] : no dizziness [Fainting] : no fainting [Anxiety] : no anxiety [Easy Bleeding] : no tendency for easy bleeding

## 2023-08-18 NOTE — HISTORY OF PRESENT ILLNESS
[FreeTextEntry1] : Referring from nephrology Dr. Fraire Seen by GI Dr. Fredis Torrez in 2021 RFR: LFT elevation  Maciej Lange is a 58y male who is referred by GI for LFT elevation. Pt's LFT was initially elevated since 2/2016 41/69. Since then, his liver# has been fluctuated mildly.  Most recent labs 8/1/23 showed AST/ALT 41/62 ALP 65 TB 0.6 Cr 1.12  - Weight changes: 178 (2/13/23) and 253 (8/15/23) - Fibroscan 11/2021 fatty liver No cirrhosis no fibrosis - Today he reports feeling well and denies any physical complaints.   [Other Medical Hx] HTN, Hypothyroidism, HLD, Obesity [Surgical Hx] None [Social Hx] Alcohol: Social Tobacco: Never illicit drug: Denies Herb and dietary Supplement: Denies [FMH of liver disease] None mother father do not have DM  [Meds] Atorvastatin Calcium 40 MG Oral Tablet; Take 1 tablet daily Chlorthalidone 25 MG Oral Tablet; TAKE ONE-HALF TABLET BY MOUTH DAILY Levothyroxine Sodium 25 MCG Oral Tablet; TAKE ONE TABLET BY MOUTH DAILY Losartan Potassium 50 MG Oral Tablet; TAKE 1 TABLET BY MOUTH TWICE DAILY  Allergic to tree nuts &crustaceans NKDA

## 2023-08-18 NOTE — ASSESSMENT
[FreeTextEntry1] : Maciej Lange is a 58y male who is referred by GI for LFT elevation. Pt's LFT was initially elevated since 2/2016 41/69. Since then, his liver# has been fluctuated mildly.  Most recent labs 8/1/23 showed AST/ALT 41/62 ALP 65 TB 0.6 Cr 1.12  [Plan]  # LFT Elevation DDX: suspected NAFLD vs others Risk factors of NAFLD are HTN, HLD, and Central obesity.  Asymptomatic - f/u with endo/PCP to manage his Hypothyroidism - f/u with PCP to manage HTN and HLD - Education and counseling were provided to the patient. - Discussed at length with the patient that next course of action would depend on results - Avoid alcohol, herb, dietary supplement - Advised weight loss 5-10%  -  Fibroscan 11/2021 fatty liver No cirrhosis no fibrosis  - Comprehensive labs - Fibroscan on the same day of RTO - RTO in 3 months

## 2023-08-18 NOTE — PHYSICAL EXAM
[Scleral Icterus] : No Scleral Icterus [Spider Angioma] : No spider angioma(s) were observed [Abdominal  Ascites] : no ascites [Splenomegaly] : no splenomegaly [Non-Tender] : non-tender [Jaundice] : No jaundice [General Appearance - Alert] : alert [General Appearance - In No Acute Distress] : in no acute distress [Sclera] : the sclera and conjunctiva were normal [Abdomen Tenderness] : non-tender [Abdomen Hernia] : no hernia was discovered [Abnormal Walk] : normal gait [Skin Color & Pigmentation] : normal skin color and pigmentation [Oriented To Time, Place, And Person] : oriented to person, place, and time [Impaired Insight] : insight and judgment were intact [Affect] : the affect was normal

## 2023-08-21 PROBLEM — R76.8 HEPATITIS B CORE ANTIBODY POSITIVE: Status: ACTIVE | Noted: 2021-12-02

## 2023-08-24 LAB
A1AT SERPL-MCNC: 131 MG/DL
AFP-TM SERPL-MCNC: 3.5 NG/ML
ALBUMIN SERPL ELPH-MCNC: 5 G/DL
ALP BLD-CCNC: 64 U/L
ALT SERPL-CCNC: 44 U/L
ANA SER IF-ACNC: NEGATIVE
ANION GAP SERPL CALC-SCNC: 13 MMOL/L
AST SERPL-CCNC: 37 U/L
BILIRUB SERPL-MCNC: 0.6 MG/DL
BUN SERPL-MCNC: 18 MG/DL
CALCIUM SERPL-MCNC: 10.1 MG/DL
CERULOPLASMIN SERPL-MCNC: 20 MG/DL
CHLORIDE SERPL-SCNC: 104 MMOL/L
CO2 SERPL-SCNC: 24 MMOL/L
CREAT SERPL-MCNC: 1.16 MG/DL
DEPRECATED KAPPA LC FREE/LAMBDA SER: 1.32 RATIO
EGFR: 73 ML/MIN/1.73M2
FERRITIN SERPL-MCNC: 491 NG/ML
GGT SERPL-CCNC: 94 U/L
GLUCOSE SERPL-MCNC: 102 MG/DL
HBV CORE IGG+IGM SER QL: REACTIVE
HBV SURFACE AB SERPL IA-ACNC: >1000 MIU/ML
HBV SURFACE AG SER QL: NONREACTIVE
HCV AB SER QL: NONREACTIVE
HCV S/CO RATIO: 0.09 S/CO
HEPATITIS A IGG ANTIBODY: REACTIVE
IGA SER QL IEP: 227 MG/DL
IGG SER QL IEP: 1132 MG/DL
IGM SER QL IEP: 36 MG/DL
IRON SATN MFR SERPL: 34 %
IRON SERPL-MCNC: 122 UG/DL
KAPPA LC CSF-MCNC: 1.32 MG/DL
KAPPA LC SERPL-MCNC: 1.74 MG/DL
POTASSIUM SERPL-SCNC: 4.3 MMOL/L
PROT SERPL-MCNC: 7.5 G/DL
SMOOTH MUSCLE AB SER QL IF: NORMAL
SODIUM SERPL-SCNC: 140 MMOL/L
TIBC SERPL-MCNC: 361 UG/DL
UIBC SERPL-MCNC: 239 UG/DL

## 2023-09-05 ENCOUNTER — APPOINTMENT (OUTPATIENT)
Dept: INTERNAL MEDICINE | Facility: CLINIC | Age: 58
End: 2023-09-05
Payer: MEDICAID

## 2023-09-05 VITALS
WEIGHT: 265 LBS | TEMPERATURE: 99 F | BODY MASS INDEX: 35.94 KG/M2 | OXYGEN SATURATION: 95 % | HEART RATE: 89 BPM | DIASTOLIC BLOOD PRESSURE: 84 MMHG | SYSTOLIC BLOOD PRESSURE: 124 MMHG

## 2023-09-05 DIAGNOSIS — M77.8 OTHER ENTHESOPATHIES, NOT ELSEWHERE CLASSIFIED: ICD-10-CM

## 2023-09-05 PROCEDURE — 99214 OFFICE O/P EST MOD 30 MIN: CPT

## 2023-09-05 NOTE — HISTORY OF PRESENT ILLNESS
[de-identified] : 58 yr old w hypothyroidism, HTN, HLD, CKD, fatty liver, obesity, here for follow up; he consulted hepatology and has f/u Fibroscan of liver in 3 months; labs unremarkable.   TSH normal in 8/2023.  Had colo in 2020, next 2025.  CPE this Nov.  Had brought his weight down to 150 but gained recently, declines rx, wishes to cont w lower nilay diet had done left shoulder PT for capsulitis a few years ago; but now last six months increasing stiffness and pain.

## 2023-09-05 NOTE — ASSESSMENT
[FreeTextEntry1] : Jannie 58 yr old doing well  working on losing weight  f/u at CPE  PT for shoulder pain on left chronic

## 2023-09-12 ENCOUNTER — APPOINTMENT (OUTPATIENT)
Dept: HEPATOLOGY | Facility: CLINIC | Age: 58
End: 2023-09-12
Payer: MEDICAID

## 2023-09-12 VITALS
BODY MASS INDEX: 35.21 KG/M2 | HEART RATE: 83 BPM | OXYGEN SATURATION: 97 % | SYSTOLIC BLOOD PRESSURE: 115 MMHG | HEIGHT: 72 IN | TEMPERATURE: 97.7 F | RESPIRATION RATE: 14 BRPM | WEIGHT: 260 LBS | DIASTOLIC BLOOD PRESSURE: 83 MMHG

## 2023-09-12 PROCEDURE — 76981 USE PARENCHYMA: CPT

## 2023-09-12 PROCEDURE — 99214 OFFICE O/P EST MOD 30 MIN: CPT

## 2023-09-26 ENCOUNTER — APPOINTMENT (OUTPATIENT)
Dept: ORTHOPEDIC SURGERY | Facility: CLINIC | Age: 58
End: 2023-09-26
Payer: MEDICAID

## 2023-09-26 VITALS — WEIGHT: 257 LBS | HEIGHT: 72 IN | BODY MASS INDEX: 34.81 KG/M2

## 2023-09-26 DIAGNOSIS — M75.02 ADHESIVE CAPSULITIS OF LEFT SHOULDER: ICD-10-CM

## 2023-09-26 PROCEDURE — 99204 OFFICE O/P NEW MOD 45 MIN: CPT

## 2023-09-26 PROCEDURE — 73030 X-RAY EXAM OF SHOULDER: CPT | Mod: LT

## 2023-10-30 ENCOUNTER — RX RENEWAL (OUTPATIENT)
Age: 58
End: 2023-10-30

## 2023-11-22 ENCOUNTER — RX RENEWAL (OUTPATIENT)
Age: 58
End: 2023-11-22

## 2023-12-05 ENCOUNTER — APPOINTMENT (OUTPATIENT)
Age: 58
End: 2023-12-05

## 2023-12-12 ENCOUNTER — APPOINTMENT (OUTPATIENT)
Dept: HEPATOLOGY | Facility: CLINIC | Age: 58
End: 2023-12-12
Payer: MEDICAID

## 2023-12-12 VITALS
DIASTOLIC BLOOD PRESSURE: 79 MMHG | HEART RATE: 67 BPM | RESPIRATION RATE: 16 BRPM | OXYGEN SATURATION: 95 % | TEMPERATURE: 97 F | SYSTOLIC BLOOD PRESSURE: 111 MMHG

## 2023-12-12 VITALS — HEIGHT: 72 IN | BODY MASS INDEX: 35.21 KG/M2 | WEIGHT: 260 LBS

## 2023-12-12 PROCEDURE — 99214 OFFICE O/P EST MOD 30 MIN: CPT

## 2023-12-18 NOTE — HISTORY OF PRESENT ILLNESS
[FreeTextEntry1] : Maciej Lange is a 58y male with hx of LFT elevation who presents for the follow-up. Pt's LFT was initially elevated since 2/2016 41/69. Since then, his liver# has been fluctuated mildly.  - 8/1/23 showed AST/ALT 41/62 - 8/15/23 LFTs went down to 37/44 - BMI 35 (weight increased a little bit 3lb) - Fibroscan 11/2021 fatty liver No cirrhosis no fibrosis - Fibroscan 9/12/23:  4.0 (S0 F0-1)  - Labs ordered before visit but not done yet - Today he reports feeling well and denies any physical complaints.  [Other Medical Hx] HTN, Hypothyroidism, HLD, Obesity [Surgical Hx] None [Social Hx] Alcohol: Social Tobacco: Never illicit drug: Denies Herb and dietary Supplement: Denies [FMH of liver disease] None mother father do not have DM  [Labs] 2023 Negative/wnl: Iron sat, A1AT, Ceruloplasmin, IGG, IGA, IGM, SMA, ANGELES, PTH, Mg, VitB12, TSH,   2021 Negative/wnl: LKM, HIV  [Meds] Atorvastatin Calcium 40 MG QD Chlorthalidone 25 MG  Levothyroxine Sodium 25 MCG  Losartan Potassium 50 MG BID

## 2023-12-18 NOTE — ASSESSMENT
[FreeTextEntry1] :  58y male   [A/P]  # LFT Elevation suspected MASLD given the risk factors (HTN, HLD, and Central obesity.)  No cirrhosis, No fibrosis Asymptomatic - f/u with endo/PCP to manage his Hypothyroidism - f/u with PCP to manage HTN and HLD  - advised caloric restriction - Education and counseling were provided to the patient. - Avoid alcohol, herb, dietary supplement -target weight loss with intensive lifestyle modification 5-10% -Advised on self-monitoring, stress management and keeping a log of food/beverage intake and physical activity  - Fibroscan 11/2021 fatty liver No cirrhosis no fibrosis - Fibroscan 9/12/23:  4.0 (S0 F0-1)  - CMP today - Labs in a year - Fibroscan in a year - RTO in a year  # Health Maintenance - Immune to Hep A - Immune to Hep B due to prior exposure (HBsAb>1000 mIU/mL) - HCV Ab Negative.

## 2023-12-28 ENCOUNTER — RX RENEWAL (OUTPATIENT)
Age: 58
End: 2023-12-28

## 2024-01-05 DIAGNOSIS — R05.3 CHRONIC COUGH: ICD-10-CM

## 2024-01-18 ENCOUNTER — APPOINTMENT (OUTPATIENT)
Dept: NEPHROLOGY | Facility: CLINIC | Age: 59
End: 2024-01-18
Payer: MEDICAID

## 2024-01-18 VITALS — SYSTOLIC BLOOD PRESSURE: 112 MMHG | DIASTOLIC BLOOD PRESSURE: 84 MMHG | HEART RATE: 82 BPM

## 2024-01-18 VITALS — WEIGHT: 268 LBS | BODY MASS INDEX: 36.35 KG/M2

## 2024-01-18 DIAGNOSIS — R68.89 OTHER GENERAL SYMPTOMS AND SIGNS: ICD-10-CM

## 2024-01-18 DIAGNOSIS — Z79.899 OTHER LONG TERM (CURRENT) DRUG THERAPY: ICD-10-CM

## 2024-01-18 DIAGNOSIS — R79.9 ABNORMAL FINDING OF BLOOD CHEMISTRY, UNSPECIFIED: ICD-10-CM

## 2024-01-18 PROCEDURE — 36415 COLL VENOUS BLD VENIPUNCTURE: CPT

## 2024-01-18 PROCEDURE — 99214 OFFICE O/P EST MOD 30 MIN: CPT | Mod: 25

## 2024-01-18 NOTE — ASSESSMENT
[FreeTextEntry1] : -- # CKD stage 3 stable/improved. * Recheck labs. * Absolute kidney benefits/risks of SGLT2i in this clinical situation are uncertain. * Therapies for kidney disease: blood pressure control; proteinuria reduction with ARB/ACEi; other evidence-based therapies including exercise, a plant-based lower oxalate diet, and 400 mcg folic acid daily * Cardiovascular disease prevention: counseling on healthy diet, physical activity, weight loss, alcohol limitation, blood pressure control; statin therapy * A counseling information sheet on CKD which they have been instructed to read has been given. * The patient has been counseled that chronic kidney disease is a significant condition and regular office follow-up with me (at least every 4 months for now) is important for monitoring and their health, and that it is their responsibility to make a follow-up appointment. * The patient has been counseled never to stop taking their medications without discussing it with me or another doctor. * The patient has been counseled on avoiding NSAIDs. * The patient has been counseled on risk of worsening kidney function and instructed to immediately call and speak with me and go immediately to ER with any severe symptoms, nausea, vomiting, diarrhea, chest pain, or shortness of breath.  # HTN controlled. * Cont chlorthaldione, losartan. * The patient's blood pressure was checked with the [employer] [id number]-907XL using the SPRINT trial protocol after sitting quietly in an empty room with arm supported, back supported, and feet on the floor for 5 minutes. The average of 3 readings were taken. * A counseling information sheet on blood pressure and staying healthy which they have been instructed to read has been given. * The patient has been counseled to check their BP at home with an automatic arm cuff, write down the readings, and reach me directly on the phone immediately if they are persistently > 180 systolic or if SBP is less than 100 or if lightheadedness develops. They were counseled to bring in all blood pressure readings and medications next visit. * The patient has been counseled that regular office follow-up (at least every 4 months for now) is important for monitoring and for their health, and that it is their responsibility to make follow up appointments. * The patient also has been counseled that they must never stop or change any medications without discussing this with me (or another physician).  # Obesity. * Weight loss.  # Fatty liver. * Follow up with GI, follow liver tests.

## 2024-01-18 NOTE — HISTORY OF PRESENT ILLNESS
[FreeTextEntry1] : PCP: Dr. Shaniqua Briseno  * Following up with GI for hepatic steatosis. * No renal colic. * stable obesity. * Borderline CKD. eGFR now normal without albuminuria. * Borderline DM, HGA1c 5.9. * * Doesn't check BP at home.  No lightheadedness. No CP/SOB. Compliant with medications. *   Previous history (01Aug23): * eGFR nl. No albuminuria last visit.  Borderline CKD improved.  * BP controlled.  No lightheadedness. No CP/SOB. Compliant with medications. * Following up with GI for fatty liver.  * No renal colic. * Stable obesity. * Doesn't check BP at home.  No lightheadedness. No CP/SOB. Compliant with medications. *   Previous history (27Mar23): * eGFR 92 based on cystatin C of 0.81 ([facility] eGFRcys equation, [facility] 2023). * HTN controlled. No lightheadedness. No CP/SOB. Compliant with medications.  * Following up with GI for fatty liver. AST/ALT 50 - 80s.  * Lost 19 pounds. Weight decreased to 258.   Previous history (04Nov22): * Followed up with GI for fatty liver. * CKD previously stable, creatinine 1.2. * * HTN controlled. No lightheadedness. No CP/SOB. Compliant with medications. * Off truvada.   Previous history (17Nov21): * Lipitor increased to 40. * No symptoms of kdiney stones. * Off truvada.* CKD stable, creatinine 1.2. AST/ALT abnormal due to fatty liver. Fib-4 1.7. * * HTN controlled. No lightheadedness. Compliant with medications.   Previous history (02Jul21): *  with Dr. Briseno. He hasn't checked BP at home. * No symptoms of kdiney stones. * Off truvada. * CKD improved, creatinine 1.19. No albuminuria. * AST/ALT abnormal. CT previously showed fatty liver. 1 drink or less daily. 5 drinks weekly.   Previous history (15Dec20): * HTN controlled. No lightheadedness. * No symptoms of kidneys stones. * CKD stable, creatinine 1.37. Off truvada.   Previous history (25Aug20): * CKD previously stable, creatinine 1.24. Off truvada. * HTN borderline controlled, 120s/80s. Increased stress today. * No symptoms of kidney stones.   Previous history (30Jun20): * Covid AB ([facility] CMIA) negative. * CKD previously improved, creatinine 1.25. * No symptoms of kidney stones. * He has not rechecked BP at home, previously 120s/90s. * He has stopped truvada.   Previous history (06May20): * On April 3 he developed fever and slight SOB. Diagnosed with probable COVID. Sx resolved completely 10days ago. * CKD creatinine, creatinine 1.48 on 2/18/20. * No symptoms of kidney stones. * He checked his BP only once this morning. BP 130s/90s.   Previous history (09Apr19): * CKD stable, creatinine 1.26. * HTN controlled. No lightheadedness. Compliant with medications. * No symptoms of kidney stones. * Borderline DM improved.   Previous history (07May19): - CKD stable, creatinine 1.2. No proteinuria or hematuria. -  HTN now controlled. No lightheadedness. *  Borderline DM improved. /92 standing in office. * Truvada, HCTZ, and losartan require monitoring. * No symptoms of kidney stones. * Borderline DM, HGA1c 5.6 (now improved).

## 2024-01-20 LAB
25(OH)D3 SERPL-MCNC: 44.5 NG/ML
ALBUMIN SERPL ELPH-MCNC: 5.1 G/DL
ALP BLD-CCNC: 64 U/L
ALT SERPL-CCNC: 41 U/L
ANION GAP SERPL CALC-SCNC: 16 MMOL/L
APO B SERPL-MCNC: 83 MG/DL
APPEARANCE: CLEAR
AST SERPL-CCNC: 34 U/L
BASOPHILS # BLD AUTO: 0.05 K/UL
BASOPHILS NFR BLD AUTO: 0.8 %
BILIRUB SERPL-MCNC: 0.8 MG/DL
BILIRUBIN URINE: NEGATIVE
BLOOD URINE: NEGATIVE
BUN SERPL-MCNC: 15 MG/DL
CALCIUM SERPL-MCNC: 9.7 MG/DL
CALCIUM SERPL-MCNC: 9.7 MG/DL
CHLORIDE SERPL-SCNC: 103 MMOL/L
CHOLEST SERPL-MCNC: 160 MG/DL
CO2 SERPL-SCNC: 24 MMOL/L
COLOR: YELLOW
CREAT SERPL-MCNC: 1.2 MG/DL
CREAT SPEC-SCNC: 176 MG/DL
CREAT SPEC-SCNC: 176 MG/DL
CREAT/PROT UR: 0.1 RATIO
CYSTATIN C SERPL-MCNC: 0.91 MG/L
EGFR: 71 ML/MIN/1.73M2
EOSINOPHIL # BLD AUTO: 0.08 K/UL
EOSINOPHIL NFR BLD AUTO: 1.2 %
ESTIMATED AVERAGE GLUCOSE: 126 MG/DL
FERRITIN SERPL-MCNC: 491 NG/ML
GFR/BSA.PRED SERPLBLD CYS-BASED-ARV: 89 ML/MIN/1.73M2
GLUCOSE QUALITATIVE U: NEGATIVE MG/DL
GLUCOSE SERPL-MCNC: 103 MG/DL
HBA1C MFR BLD HPLC: 6 %
HCT VFR BLD CALC: 47.5 %
HDLC SERPL-MCNC: 46 MG/DL
HGB BLD-MCNC: 15.5 G/DL
IMM GRANULOCYTES NFR BLD AUTO: 0.2 %
KETONES URINE: NEGATIVE MG/DL
LDLC SERPL CALC-MCNC: 91 MG/DL
LEUKOCYTE ESTERASE URINE: NEGATIVE
LYMPHOCYTES # BLD AUTO: 2.97 K/UL
LYMPHOCYTES NFR BLD AUTO: 46 %
MAGNESIUM SERPL-MCNC: 2.2 MG/DL
MAN DIFF?: NORMAL
MCHC RBC-ENTMCNC: 27.1 PG
MCHC RBC-ENTMCNC: 32.6 GM/DL
MCV RBC AUTO: 82.9 FL
MICROALBUMIN 24H UR DL<=1MG/L-MCNC: 1.9 MG/DL
MICROALBUMIN/CREAT 24H UR-RTO: 11 MG/G
MONOCYTES # BLD AUTO: 0.47 K/UL
MONOCYTES NFR BLD AUTO: 7.3 %
NEUTROPHILS # BLD AUTO: 2.87 K/UL
NEUTROPHILS NFR BLD AUTO: 44.5 %
NITRITE URINE: NEGATIVE
NONHDLC SERPL-MCNC: 114 MG/DL
PARATHYROID HORMONE INTACT: 19 PG/ML
PH URINE: 5.5
PHOSPHATE SERPL-MCNC: 3.7 MG/DL
PLATELET # BLD AUTO: 189 K/UL
POTASSIUM SERPL-SCNC: 4 MMOL/L
PROT SERPL-MCNC: 7.6 G/DL
PROT UR-MCNC: 9 MG/DL
PROTEIN URINE: NEGATIVE MG/DL
PSA SERPL-MCNC: 0.56 NG/ML
RBC # BLD: 5.73 M/UL
RBC # FLD: 13.7 %
SODIUM SERPL-SCNC: 143 MMOL/L
SPECIFIC GRAVITY URINE: 1.02
TRIGL SERPL-MCNC: 129 MG/DL
TSH SERPL-ACNC: 4.04 UIU/ML
UROBILINOGEN URINE: 0.2 MG/DL
VIT B12 SERPL-MCNC: 539 PG/ML
WBC # FLD AUTO: 6.45 K/UL

## 2024-01-23 ENCOUNTER — APPOINTMENT (OUTPATIENT)
Dept: ORTHOPEDIC SURGERY | Facility: CLINIC | Age: 59
End: 2024-01-23

## 2024-02-14 ENCOUNTER — RX RENEWAL (OUTPATIENT)
Age: 59
End: 2024-02-14

## 2024-03-19 ENCOUNTER — APPOINTMENT (OUTPATIENT)
Dept: INTERNAL MEDICINE | Facility: CLINIC | Age: 59
End: 2024-03-19
Payer: MEDICAID

## 2024-03-19 ENCOUNTER — NON-APPOINTMENT (OUTPATIENT)
Age: 59
End: 2024-03-19

## 2024-03-19 VITALS
WEIGHT: 270 LBS | SYSTOLIC BLOOD PRESSURE: 123 MMHG | DIASTOLIC BLOOD PRESSURE: 88 MMHG | OXYGEN SATURATION: 95 % | HEIGHT: 72 IN | HEART RATE: 82 BPM | TEMPERATURE: 97 F | BODY MASS INDEX: 36.57 KG/M2

## 2024-03-19 DIAGNOSIS — R73.02 IMPAIRED GLUCOSE TOLERANCE (ORAL): ICD-10-CM

## 2024-03-19 DIAGNOSIS — Z12.5 ENCOUNTER FOR SCREENING FOR MALIGNANT NEOPLASM OF PROSTATE: ICD-10-CM

## 2024-03-19 DIAGNOSIS — R79.89 OTHER SPECIFIED ABNORMAL FINDINGS OF BLOOD CHEMISTRY: ICD-10-CM

## 2024-03-19 DIAGNOSIS — Z12.11 ENCOUNTER FOR SCREENING FOR MALIGNANT NEOPLASM OF COLON: ICD-10-CM

## 2024-03-19 DIAGNOSIS — E78.00 PURE HYPERCHOLESTEROLEMIA, UNSPECIFIED: ICD-10-CM

## 2024-03-19 DIAGNOSIS — Z00.00 ENCOUNTER FOR GENERAL ADULT MEDICAL EXAMINATION W/OUT ABNORMAL FINDINGS: ICD-10-CM

## 2024-03-19 DIAGNOSIS — I10 ESSENTIAL (PRIMARY) HYPERTENSION: ICD-10-CM

## 2024-03-19 DIAGNOSIS — Z11.3 ENCOUNTER FOR SCREENING FOR INFECTIONS WITH A PREDOMINANTLY SEXUAL MODE OF TRANSMISSION: ICD-10-CM

## 2024-03-19 DIAGNOSIS — F32.A DEPRESSION, UNSPECIFIED: ICD-10-CM

## 2024-03-19 PROCEDURE — 93000 ELECTROCARDIOGRAM COMPLETE: CPT

## 2024-03-19 PROCEDURE — 99396 PREV VISIT EST AGE 40-64: CPT

## 2024-03-19 PROCEDURE — 36415 COLL VENOUS BLD VENIPUNCTURE: CPT

## 2024-03-19 RX ORDER — MELOXICAM 15 MG/1
15 TABLET ORAL
Qty: 30 | Refills: 1 | Status: DISCONTINUED | COMMUNITY
Start: 2023-09-26 | End: 2024-03-19

## 2024-03-20 LAB
ALBUMIN SERPL ELPH-MCNC: 5.1 G/DL
ALP BLD-CCNC: 64 U/L
ALT SERPL-CCNC: 42 U/L
ANION GAP SERPL CALC-SCNC: 15 MMOL/L
AST SERPL-CCNC: 42 U/L
BILIRUB SERPL-MCNC: 0.7 MG/DL
BUN SERPL-MCNC: 13 MG/DL
CALCIUM SERPL-MCNC: 10.3 MG/DL
CHLORIDE SERPL-SCNC: 104 MMOL/L
CHOLEST SERPL-MCNC: 147 MG/DL
CO2 SERPL-SCNC: 28 MMOL/L
CREAT SERPL-MCNC: 1.29 MG/DL
CREAT SPEC-SCNC: 180 MG/DL
CREAT/PROT UR: 0.1 RATIO
EGFR: 64 ML/MIN/1.73M2
ESTIMATED AVERAGE GLUCOSE: 123 MG/DL
GLUCOSE SERPL-MCNC: 115 MG/DL
HBA1C MFR BLD HPLC: 5.9 %
HCT VFR BLD CALC: 45.8 %
HDLC SERPL-MCNC: 40 MG/DL
HGB BLD-MCNC: 15.2 G/DL
LDLC SERPL CALC-MCNC: 83 MG/DL
MCHC RBC-ENTMCNC: 27.8 PG
MCHC RBC-ENTMCNC: 33.2 GM/DL
MCV RBC AUTO: 83.7 FL
NONHDLC SERPL-MCNC: 107 MG/DL
PLATELET # BLD AUTO: 179 K/UL
POTASSIUM SERPL-SCNC: 4.1 MMOL/L
PROT SERPL-MCNC: 7.6 G/DL
PROT UR-MCNC: 10 MG/DL
PSA SERPL-MCNC: 0.45 NG/ML
RBC # BLD: 5.47 M/UL
RBC # FLD: 13.2 %
SODIUM SERPL-SCNC: 146 MMOL/L
TRIGL SERPL-MCNC: 137 MG/DL
TSH SERPL-ACNC: 3.37 UIU/ML
WBC # FLD AUTO: 5.32 K/UL

## 2024-04-08 ENCOUNTER — RX RENEWAL (OUTPATIENT)
Age: 59
End: 2024-04-08

## 2024-04-12 ENCOUNTER — RX RENEWAL (OUTPATIENT)
Age: 59
End: 2024-04-12

## 2024-04-18 RX ORDER — CHLORTHALIDONE 25 MG/1
25 TABLET ORAL DAILY
Qty: 45 | Refills: 3 | Status: ACTIVE | COMMUNITY
Start: 2020-06-30 | End: 1900-01-01

## 2024-04-18 RX ORDER — AZITHROMYCIN 250 MG/1
250 TABLET, FILM COATED ORAL
Qty: 1 | Refills: 0 | Status: DISCONTINUED | COMMUNITY
Start: 2024-01-05 | End: 2024-04-18

## 2024-04-18 RX ORDER — LEVOTHYROXINE SODIUM 0.03 MG/1
25 TABLET ORAL
Qty: 90 | Refills: 3 | Status: ACTIVE | COMMUNITY
Start: 2020-05-20 | End: 1900-01-01

## 2024-05-16 ENCOUNTER — APPOINTMENT (OUTPATIENT)
Dept: NEPHROLOGY | Facility: CLINIC | Age: 59
End: 2024-05-16
Payer: MEDICAID

## 2024-05-16 VITALS — SYSTOLIC BLOOD PRESSURE: 128 MMHG | DIASTOLIC BLOOD PRESSURE: 95 MMHG | HEART RATE: 100 BPM

## 2024-05-16 VITALS — SYSTOLIC BLOOD PRESSURE: 117 MMHG | DIASTOLIC BLOOD PRESSURE: 83 MMHG

## 2024-05-16 DIAGNOSIS — N20.0 CALCULUS OF KIDNEY: ICD-10-CM

## 2024-05-16 DIAGNOSIS — I10 ESSENTIAL (PRIMARY) HYPERTENSION: ICD-10-CM

## 2024-05-16 DIAGNOSIS — K76.0 FATTY (CHANGE OF) LIVER, NOT ELSEWHERE CLASSIFIED: ICD-10-CM

## 2024-05-16 DIAGNOSIS — N18.9 CHRONIC KIDNEY DISEASE, UNSPECIFIED: ICD-10-CM

## 2024-05-16 DIAGNOSIS — R79.89 OTHER SPECIFIED ABNORMAL FINDINGS OF BLOOD CHEMISTRY: ICD-10-CM

## 2024-05-16 PROCEDURE — G2211 COMPLEX E/M VISIT ADD ON: CPT | Mod: NC,1L

## 2024-05-16 PROCEDURE — 99214 OFFICE O/P EST MOD 30 MIN: CPT

## 2024-05-16 NOTE — ASSESSMENT
[FreeTextEntry1] : -- # CKD stage 3 stable/improved. * Recheck labs next visit.  * Absolute kidney benefits/risks of SGLT2i in this clinical situation are uncertain. No proteinuria.  * Therapies for kidney disease: blood pressure control; proteinuria reduction with ARB/ACEi; other evidence-based therapies including exercise, a plant-based lower oxalate diet, and 400 mcg folic acid daily * Cardiovascular disease prevention: counseling on healthy diet, physical activity, weight loss, alcohol limitation, blood pressure control; statin therapy * A counseling information sheet on CKD which they have been instructed to read has been given. * The patient has been counseled that chronic kidney disease is a significant condition and regular office follow-up with me (at least every 3 months for now) is important for monitoring and their health, and that it is their responsibility to make a follow-up appointment. * The patient has been counseled never to stop taking their medications without discussing it with me or another doctor. * The patient has been counseled on avoiding NSAIDs. * The patient has been counseled on risk of worsening kidney function and instructed to immediately call and speak with me and go immediately to ER with any severe symptoms, nausea, vomiting, diarrhea, chest pain, or shortness of breath.  # HTN controlled. * Cont chlorthaldione, losartan. * The patient's blood pressure was checked with the [employer] HEM-907XL using the SPRINT trial protocol after sitting quietly in an empty room with arm supported, back supported, and feet on the floor for 5 minutes. The average of 3 readings were taken. * A counseling information sheet on blood pressure and staying healthy which they have been instructed to read has been given. * The patient has been counseled to check their BP at home with an automatic arm cuff, write down the readings, and reach me directly on the phone immediately if they are persistently > 180 systolic or if SBP is less than 100 or if lightheadedness develops. They were counseled to bring in all blood pressure readings and medications next visit. * The patient has been counseled that regular office follow-up (at least every 3 months for now) is important for monitoring and for their health, and that it is their responsibility to make follow up appointments. * The patient also has been counseled that they must never stop or change any medications without discussing this with me (or another physician).  # Obesity. * Weight loss.  # Fatty liver. * Follow up with GI, follow liver tests.

## 2024-05-16 NOTE — HISTORY OF PRESENT ILLNESS
[FreeTextEntry1] : PCP: Dr. Shaniqua Briseno  BP controlled.  No SOB with exertion. No CP. No lightheadedness. Compliant with medications. * Following up with GI for hepatic steatosis. * No renal colic. * stable obesity. * Borderline CKD. eGFR now normal without albuminuria. * Borderline DM, HGA1c 5.9. *   Previous history (18Jan24): * Following up with GI for hepatic steatosis. * No renal colic. * stable obesity. * Borderline CKD. eGFR now normal without albuminuria. * Borderline DM, HGA1c 5.9. * * Doesn't check BP at home.  No lightheadedness. No CP/SOB. Compliant with medications. *   Previous history (01Aug23): * eGFR nl. No albuminuria last visit.  Borderline CKD improved.  * BP controlled.  No lightheadedness. No CP/SOB. Compliant with medications. * Following up with GI for fatty liver.  * No renal colic. * Stable obesity. * Doesn't check BP at home.  No lightheadedness. No CP/SOB. Compliant with medications. *   Previous history (27Mar23): * eGFR 92 based on cystatin C of 0.81 ([facility] eGFRcys equation, [facility] 2023). * HTN controlled. No lightheadedness. No CP/SOB. Compliant with medications.  * Following up with GI for fatty liver. AST/ALT 50 - 80s.  * Lost 19 pounds. Weight decreased to 258.   Previous history (04Nov22): * Followed up with GI for fatty liver. * CKD previously stable, creatinine 1.2. * * HTN controlled. No lightheadedness. No CP/SOB. Compliant with medications. * Off truvada.   Previous history (17Nov21): * Lipitor increased to 40. * No symptoms of kdiney stones. * Off truvada.* CKD stable, creatinine 1.2. AST/ALT abnormal due to fatty liver. Fib-4 1.7. * * HTN controlled. No lightheadedness. Compliant with medications.   Previous history (02Jul21): *  with Dr. Briseno. He hasn't checked BP at home. * No symptoms of kdiney stones. * Off truvada. * CKD improved, creatinine 1.19. No albuminuria. * AST/ALT abnormal. CT previously showed fatty liver. 1 drink or less daily. 5 drinks weekly.   Previous history (20Coe24): * HTN controlled. No lightheadedness. * No symptoms of kidneys stones. * CKD stable, creatinine 1.37. Off truvada.   Previous history (19Znq88): * CKD previously stable, creatinine 1.24. Off truvada. * HTN borderline controlled, 120s/80s. Increased stress today. * No symptoms of kidney stones.   Previous history (30Jun20): * Covid AB ([facility] CMIA) negative. * CKD previously improved, creatinine 1.25. * No symptoms of kidney stones. * He has not rechecked BP at home, previously 120s/90s. * He has stopped truvada.   Previous history (06May20): * On April 3 he developed fever and slight SOB. Diagnosed with probable COVID. Sx resolved completely 10days ago. * CKD creatinine, creatinine 1.48 on 2/18/20. * No symptoms of kidney stones. * He checked his BP only once this morning. BP 130s/90s.   Previous history (09Apr19): * CKD stable, creatinine 1.26. * HTN controlled. No lightheadedness. Compliant with medications. * No symptoms of kidney stones. * Borderline DM improved.   Previous history (07May19): - CKD stable, creatinine 1.2. No proteinuria or hematuria. -  HTN now controlled. No lightheadedness. *  Borderline DM improved. /92 standing in office. * Truvada, HCTZ, and losartan require monitoring. * No symptoms of kidney stones. * Borderline DM, HGA1c 5.6 (now improved).

## 2024-05-31 RX ORDER — VALACYCLOVIR 1 G/1
1 TABLET, FILM COATED ORAL
Qty: 21 | Refills: 1 | Status: ACTIVE | COMMUNITY
Start: 2024-05-31 | End: 1900-01-01

## 2024-06-25 ENCOUNTER — APPOINTMENT (OUTPATIENT)
Dept: INTERNAL MEDICINE | Facility: CLINIC | Age: 59
End: 2024-06-25
Payer: MEDICAID

## 2024-06-25 VITALS
DIASTOLIC BLOOD PRESSURE: 88 MMHG | WEIGHT: 268 LBS | SYSTOLIC BLOOD PRESSURE: 122 MMHG | OXYGEN SATURATION: 96 % | HEIGHT: 72 IN | BODY MASS INDEX: 36.3 KG/M2 | TEMPERATURE: 97.8 F | HEART RATE: 95 BPM

## 2024-06-25 DIAGNOSIS — B02.29 OTHER POSTHERPETIC NERVOUS SYSTEM INVOLVEMENT: ICD-10-CM

## 2024-06-25 DIAGNOSIS — B02.9 ZOSTER W/OUT COMPLICATIONS: ICD-10-CM

## 2024-06-25 PROCEDURE — 99213 OFFICE O/P EST LOW 20 MIN: CPT

## 2024-06-25 RX ORDER — GABAPENTIN 300 MG/1
300 CAPSULE ORAL 3 TIMES DAILY
Qty: 3 | Refills: 2 | Status: ACTIVE | COMMUNITY
Start: 2024-06-13 | End: 1900-01-01

## 2024-06-25 RX ORDER — METHYLPREDNISOLONE 4 MG/1
4 TABLET ORAL
Qty: 1 | Refills: 0 | Status: ACTIVE | COMMUNITY
Start: 2024-06-25 | End: 1900-01-01

## 2024-07-25 ENCOUNTER — NON-APPOINTMENT (OUTPATIENT)
Age: 59
End: 2024-07-25

## 2024-07-26 ENCOUNTER — APPOINTMENT (OUTPATIENT)
Dept: DERMATOLOGY | Facility: CLINIC | Age: 59
End: 2024-07-26
Payer: MEDICAID

## 2024-07-26 ENCOUNTER — APPOINTMENT (OUTPATIENT)
Dept: DERMATOLOGY | Facility: CLINIC | Age: 59
End: 2024-07-26
Payer: SELF-PAY

## 2024-07-26 ENCOUNTER — NON-APPOINTMENT (OUTPATIENT)
Age: 59
End: 2024-07-26

## 2024-07-26 DIAGNOSIS — L82.1 OTHER SEBORRHEIC KERATOSIS: ICD-10-CM

## 2024-07-26 DIAGNOSIS — L91.8 OTHER HYPERTROPHIC DISORDERS OF THE SKIN: ICD-10-CM

## 2024-07-26 DIAGNOSIS — B02.29 OTHER POSTHERPETIC NERVOUS SYSTEM INVOLVEMENT: ICD-10-CM

## 2024-07-26 DIAGNOSIS — B02.9 ZOSTER W/OUT COMPLICATIONS: ICD-10-CM

## 2024-07-26 PROCEDURE — 99204 OFFICE O/P NEW MOD 45 MIN: CPT

## 2024-07-26 PROCEDURE — D0125: CPT

## 2024-07-26 NOTE — HISTORY OF PRESENT ILLNESS
[FreeTextEntry1] : skin tag removal [de-identified] : 58yo M presents for skin tag removal R axilla bothersome

## 2024-07-26 NOTE — PHYSICAL EXAM
[Alert] : alert [Oriented x 3] : ~L oriented x 3 [Declined] : declined [FreeTextEntry3] : Focused exam: -skin colored papule R axilla

## 2024-07-26 NOTE — ASSESSMENT
[FreeTextEntry1] : #Acrochordon - R axilla -snip removal today Snip Removal Procedure Note   Informed written consent was obtained. Common risks, benefits, and alternatives were explained. Side effects including but not limited to bleeding, pain, ecchymosis, infection, scarring, nerve damage, and recurrence were discussed. Prior to the procedure, a timeout was performed. Procedure site was cleaned with alcohol pad and local anesthesia was achieved by using a total of 0.5 cc of 1% lidocaine with epinephrine. Selected lesion was removed via snip technique using gradle scissors. Hemostasis was achieved with aluminum chloride solution. The patient tolerated the procedure well without complications. Site was dressed with white petrolatum (Vaseline) ointment and Band-Aid. Verbal wound care instructions were provided.   $50 cosmetic charge

## 2024-07-28 PROBLEM — L82.1 SEBORRHEIC KERATOSIS: Status: ACTIVE | Noted: 2024-07-28

## 2024-07-28 NOTE — HISTORY OF PRESENT ILLNESS
[FreeTextEntry1] : tbse, shingles [de-identified] : 58yo M presents for tbse and shingles No personal or family hx of skin cancer had shingles on R back in May still with pain on gabapentin 300mg tid per pcp Cr wnl 3/2024 wants skin tag removed from R axilla No new, changing, growing, bleeding, concerning skin lesions noticed

## 2024-07-28 NOTE — PHYSICAL EXAM
[Alert] : alert [Oriented x 3] : ~L oriented x 3 [Full Body Skin Exam Performed] : performed [FreeTextEntry3] : PE:   General: well-appearing, alert, in no acute distress   Full body skin exam performed examining scalp, head, face, ears, eyes, mouth, neck, chest, back, abdomen, axilla, b/l arms, b/l forearms, b/l hands, b/l fingernails, b/l thighs, b/l legs, b/l feet, b/l toenails Pertinent findings include: -groin/genitalia/buttocks exam deferred -hyperpigmented patches on R back extending onto R chest -brown stuck on papules, plaques on the trunk and extremities -skin colored papule R axilla

## 2024-07-28 NOTE — ASSESSMENT
[FreeTextEntry1] : #Postherpetic neuralgia -chronic, flaring -I have discussed the chronic nature and course of this condition -continue gabapentin 300mg tid per PCP -discussed adding topical amitryptiline/gabapentin - can apply 1-2 times daily prn, sent to [facility]  # Seborrheic keratosis, trunk/extremities  - chronic, stable -I discussed the chronic nature and course of the condition -reviewed benign nature   RTC 1 year for TBSE, sooner PRN

## 2024-09-11 ENCOUNTER — APPOINTMENT (OUTPATIENT)
Dept: NEPHROLOGY | Facility: CLINIC | Age: 59
End: 2024-09-11
Payer: MEDICAID

## 2024-09-11 VITALS — SYSTOLIC BLOOD PRESSURE: 137 MMHG | HEART RATE: 93 BPM | DIASTOLIC BLOOD PRESSURE: 89 MMHG

## 2024-09-11 DIAGNOSIS — R79.9 ABNORMAL FINDING OF BLOOD CHEMISTRY, UNSPECIFIED: ICD-10-CM

## 2024-09-11 DIAGNOSIS — R79.89 OTHER SPECIFIED ABNORMAL FINDINGS OF BLOOD CHEMISTRY: ICD-10-CM

## 2024-09-11 DIAGNOSIS — N20.0 CALCULUS OF KIDNEY: ICD-10-CM

## 2024-09-11 DIAGNOSIS — N18.9 CHRONIC KIDNEY DISEASE, UNSPECIFIED: ICD-10-CM

## 2024-09-11 DIAGNOSIS — R68.89 OTHER GENERAL SYMPTOMS AND SIGNS: ICD-10-CM

## 2024-09-11 PROCEDURE — 99214 OFFICE O/P EST MOD 30 MIN: CPT | Mod: 25

## 2024-09-11 PROCEDURE — 36415 COLL VENOUS BLD VENIPUNCTURE: CPT

## 2024-09-11 NOTE — HISTORY OF PRESENT ILLNESS
[FreeTextEntry1] : PCP: Dr. Shaniqua Briseno  * BP previously controlled.  No SOB with exertion. No CP. No lightheadedness. Compliant with medications. *  Following up with GI for hepatic steatosis. * No renal colic. * stable obesity. * Borderline CKD stable.   Previous history (16May24): BP controlled.  No SOB with exertion. No CP. No lightheadedness. Compliant with medications. * Following up with GI for hepatic steatosis. * No renal colic. * stable obesity. * Borderline CKD. eGFR now normal without albuminuria. * Borderline DM, HGA1c 5.9. *   Previous history (18Jan24): * Following up with GI for hepatic steatosis. * No renal colic. * stable obesity. * Borderline CKD. eGFR now normal without albuminuria. * Borderline DM, HGA1c 5.9. * * Doesn't check BP at home.  No lightheadedness. No CP/SOB. Compliant with medications. *   Previous history (01Aug23): * eGFR nl. No albuminuria last visit.  Borderline CKD improved.  * BP controlled.  No lightheadedness. No CP/SOB. Compliant with medications. * Following up with GI for fatty liver.  * No renal colic. * Stable obesity. * Doesn't check BP at home.  No lightheadedness. No CP/SOB. Compliant with medications. *   Previous history (27Mar23): * eGFR 92 based on cystatin C of 0.81 (EK eGFRcys equation, [facility] 2023). * HTN controlled. No lightheadedness. No CP/SOB. Compliant with medications.  * Following up with GI for fatty liver. AST/ALT 50 - 80s.  * Lost 19 pounds. Weight decreased to 258.   Previous history (04Nov22): * Followed up with GI for fatty liver. * CKD previously stable, creatinine 1.2. * * HTN controlled. No lightheadedness. No CP/SOB. Compliant with medications. * Off truvada.   Previous history (17Nov21): * Lipitor increased to 40. * No symptoms of kdiney stones. * Off truvada.* CKD stable, creatinine 1.2. AST/ALT abnormal due to fatty liver. Fib-4 1.7. * * HTN controlled. No lightheadedness. Compliant with medications.   Previous history ([id number]): *  with Dr. Briseno. He hasn't checked BP at home. * No symptoms of kdiney stones. * Off truvada. * CKD improved, creatinine 1.19. No albuminuria. * AST/ALT abnormal. CT previously showed fatty liver. 1 drink or less daily. 5 drinks weekly.   Previous history (40Pek82): * HTN controlled. No lightheadedness. * No symptoms of kidneys stones. * CKD stable, creatinine 1.37. Off truvada.   Previous history (25Aug20): * CKD previously stable, creatinine 1.24. Off truvada. * HTN borderline controlled, 120s/80s. Increased stress today. * No symptoms of kidney stones.   Previous history (30Jun20): * Covid AB ([facility]) negative. * CKD previously improved, creatinine 1.25. * No symptoms of kidney stones. * He has not rechecked BP at home, previously 120s/90s. * He has stopped truvada.   Previous history (06May20): * On April 3 he developed fever and slight SOB. Diagnosed with probable COVID. Sx resolved completely 10days ago. * CKD creatinine, creatinine 1.48 on 2/18/20. * No symptoms of kidney stones. * He checked his BP only once this morning. BP 130s/90s.   Previous history (09Apr19): * CKD stable, creatinine 1.26. * HTN controlled. No lightheadedness. Compliant with medications. * No symptoms of kidney stones. * Borderline DM improved.   Previous history (07May19): - CKD stable, creatinine 1.2. No proteinuria or hematuria. -  HTN now controlled. No lightheadedness. *  Borderline DM improved. /92 standing in office. * Truvada, HCTZ, and losartan require monitoring. * No symptoms of kidney stones. * Borderline DM, HGA1c 5.6 (now improved).

## 2024-09-11 NOTE — PHYSICAL EXAM
[General Appearance - Alert] : alert [General Appearance - In No Acute Distress] : in no acute distress [Neck Appearance] : the appearance of the neck was normal [Neck Cervical Mass (___cm)] : no neck mass was observed [Jugular Venous Distention Increased] : there was no jugular-venous distention [Thyroid Diffuse Enlargement] : the thyroid was not enlarged [Thyroid Nodule] : there were no palpable thyroid nodules [Auscultation Breath Sounds / Voice Sounds] : lungs were clear to auscultation bilaterally [Heart Rate And Rhythm] : heart rate was normal and rhythm regular [Heart Sounds] : normal S1 and S2 [Heart Sounds Gallop] : no gallops [Murmurs] : no murmurs [Heart Sounds Pericardial Friction Rub] : no pericardial rub [Veins - Varicosity Changes] : there were no varicosital changes [Bowel Sounds] : normal bowel sounds [Abdomen Soft] : soft [Abdomen Tenderness] : non-tender [] : no hepato-splenomegaly [Abdomen Mass (___ Cm)] : no abdominal mass palpated [FreeTextEntry1] : trace ankle edema

## 2024-09-11 NOTE — ASSESSMENT
[FreeTextEntry1] : # CKD stage 3 stable/improved. * Recheck labs. * Absolute kidney benefits/risks of SGLT2i in this clinical situation are uncertain. No proteinuria. * Therapies for kidney disease: blood pressure control; proteinuria reduction with ARB/ACEi; other evidence-based therapies including exercise, a plant-based lower oxalate diet, and 400 mcg folic acid daily * Cardiovascular disease prevention: counseling on healthy diet, physical activity, weight loss, alcohol limitation, blood pressure control; statin therapy * A counseling information sheet on CKD which they have been instructed to read has been given. * The patient has been counseled that chronic kidney disease is a significant condition and regular office follow-up with me (at least every 2 months for now) is important for monitoring and their health, and that it is their responsibility to make a follow-up appointment. * The patient has been counseled never to stop taking their medications without discussing it with me or another doctor. * The patient has been counseled on avoiding NSAIDs. * The patient has been counseled on risk of worsening kidney function and instructed to immediately call and speak with me and go immediately to ER with any severe symptoms, nausea, vomiting, diarrhea, chest pain, or shortness of breath.  # HTN controlled. ? white coat effect. * Check BP at home.  * Cont chlorthaldione, losartan. * The patient's blood pressure was checked with the [employer] [id number] using the SPRINT trial protocol after sitting quietly in an empty room with arm supported, back supported, and feet on the floor for 5 minutes. The average of 3 readings were taken. * A counseling information sheet on blood pressure and staying healthy which they have been instructed to read has been given. * The patient has been counseled to check their BP at home with an automatic arm cuff, write down the readings, and reach me directly on the phone immediately if they are persistently > 180 systolic or if SBP is less than 100 or if lightheadedness develops. They were counseled to bring in all blood pressure readings and medications next visit. * The patient has been counseled that regular office follow-up (at least every 2 months for now) is important for monitoring and for their health, and that it is their responsibility to make follow up appointments. * The patient also has been counseled that they must never stop or change any medications without discussing this with me (or another physician).  # Obesity. * Weight loss.  # Fatty liver. * Follow up with GI, follow liver tests.

## 2024-09-17 ENCOUNTER — APPOINTMENT (OUTPATIENT)
Dept: INTERNAL MEDICINE | Facility: CLINIC | Age: 59
End: 2024-09-17
Payer: MEDICAID

## 2024-09-17 VITALS
TEMPERATURE: 97.8 F | HEIGHT: 72 IN | DIASTOLIC BLOOD PRESSURE: 84 MMHG | WEIGHT: 269 LBS | SYSTOLIC BLOOD PRESSURE: 121 MMHG | HEART RATE: 92 BPM | BODY MASS INDEX: 36.44 KG/M2 | OXYGEN SATURATION: 95 %

## 2024-09-17 DIAGNOSIS — I10 ESSENTIAL (PRIMARY) HYPERTENSION: ICD-10-CM

## 2024-09-17 DIAGNOSIS — Z23 ENCOUNTER FOR IMMUNIZATION: ICD-10-CM

## 2024-09-17 DIAGNOSIS — R73.02 IMPAIRED GLUCOSE TOLERANCE (ORAL): ICD-10-CM

## 2024-09-17 DIAGNOSIS — B02.29 OTHER POSTHERPETIC NERVOUS SYSTEM INVOLVEMENT: ICD-10-CM

## 2024-09-17 PROCEDURE — 99214 OFFICE O/P EST MOD 30 MIN: CPT | Mod: 25

## 2024-09-17 PROCEDURE — 90656 IIV3 VACC NO PRSV 0.5 ML IM: CPT

## 2024-09-17 PROCEDURE — G0008: CPT

## 2024-09-21 LAB
25(OH)D3 SERPL-MCNC: 43.9 NG/ML
ALBUMIN SERPL ELPH-MCNC: 5 G/DL
ALP BLD-CCNC: 75 U/L
ALT SERPL-CCNC: 56 U/L
ANION GAP SERPL CALC-SCNC: 14 MMOL/L
APPEARANCE: CLEAR
AST SERPL-CCNC: 50 U/L
BASOPHILS # BLD AUTO: 0.04 K/UL
BASOPHILS NFR BLD AUTO: 0.5 %
BILIRUB SERPL-MCNC: 0.8 MG/DL
BILIRUBIN URINE: NEGATIVE
BLOOD URINE: NEGATIVE
BUN SERPL-MCNC: 16 MG/DL
CALCIUM SERPL-MCNC: 10.2 MG/DL
CALCIUM SERPL-MCNC: 10.2 MG/DL
CHLORIDE SERPL-SCNC: 103 MMOL/L
CHOLEST SERPL-MCNC: 147 MG/DL
CO2 SERPL-SCNC: 25 MMOL/L
COLOR: YELLOW
CREAT SERPL-MCNC: 1.39 MG/DL
CREAT SPEC-SCNC: 156 MG/DL
CREAT SPEC-SCNC: 156 MG/DL
CREAT/PROT UR: 0.1 RATIO
CYSTATIN C SERPL-MCNC: 0.86 MG/L
EGFR: 59 ML/MIN/1.73M2
EOSINOPHIL # BLD AUTO: 0.07 K/UL
EOSINOPHIL NFR BLD AUTO: 0.9 %
ESTIMATED AVERAGE GLUCOSE: 123 MG/DL
FERRITIN SERPL-MCNC: 639 NG/ML
GFR/BSA.PRED SERPLBLD CYS-BASED-ARV: 96 ML/MIN/1.73M2
GLUCOSE QUALITATIVE U: NEGATIVE MG/DL
GLUCOSE SERPL-MCNC: 111 MG/DL
HBA1C MFR BLD HPLC: 5.9 %
HCT VFR BLD CALC: 45.2 %
HDLC SERPL-MCNC: 40 MG/DL
HGB BLD-MCNC: 15.3 G/DL
IMM GRANULOCYTES NFR BLD AUTO: 0.3 %
KETONES URINE: NEGATIVE MG/DL
LDLC SERPL CALC-MCNC: 62 MG/DL
LEUKOCYTE ESTERASE URINE: NEGATIVE
LYMPHOCYTES # BLD AUTO: 2.01 K/UL
LYMPHOCYTES NFR BLD AUTO: 26.6 %
MAGNESIUM SERPL-MCNC: 2.1 MG/DL
MAN DIFF?: NORMAL
MCHC RBC-ENTMCNC: 28.4 PG
MCHC RBC-ENTMCNC: 33.8 GM/DL
MCV RBC AUTO: 83.9 FL
MICROALBUMIN 24H UR DL<=1MG/L-MCNC: 2.4 MG/DL
MICROALBUMIN/CREAT 24H UR-RTO: 15 MG/G
MONOCYTES # BLD AUTO: 0.55 K/UL
MONOCYTES NFR BLD AUTO: 7.3 %
NEUTROPHILS # BLD AUTO: 4.87 K/UL
NEUTROPHILS NFR BLD AUTO: 64.4 %
NITRITE URINE: NEGATIVE
NONHDLC SERPL-MCNC: 107 MG/DL
PARATHYROID HORMONE INTACT: 15 PG/ML
PH URINE: 5.5
PHOSPHATE SERPL-MCNC: 2.9 MG/DL
PLATELET # BLD AUTO: 223 K/UL
POTASSIUM SERPL-SCNC: 4.3 MMOL/L
PROT SERPL-MCNC: 7.6 G/DL
PROT UR-MCNC: 13 MG/DL
PROTEIN URINE: NEGATIVE MG/DL
RBC # BLD: 5.39 M/UL
RBC # FLD: 13.2 %
SODIUM SERPL-SCNC: 143 MMOL/L
SPECIFIC GRAVITY URINE: 1.02
TRIGL SERPL-MCNC: 284 MG/DL
TSH SERPL-ACNC: 2.02 UIU/ML
UROBILINOGEN URINE: 0.2 MG/DL
VIT B12 SERPL-MCNC: 476 PG/ML
WBC # FLD AUTO: 7.56 K/UL

## 2024-12-10 ENCOUNTER — APPOINTMENT (OUTPATIENT)
Dept: HEPATOLOGY | Facility: CLINIC | Age: 59
End: 2024-12-10

## 2024-12-10 ENCOUNTER — APPOINTMENT (OUTPATIENT)
Dept: NEPHROLOGY | Facility: CLINIC | Age: 59
End: 2024-12-10
Payer: MEDICAID

## 2024-12-10 VITALS — BODY MASS INDEX: 34.31 KG/M2 | WEIGHT: 253 LBS

## 2024-12-10 VITALS — SYSTOLIC BLOOD PRESSURE: 122 MMHG | HEART RATE: 86 BPM | DIASTOLIC BLOOD PRESSURE: 83 MMHG

## 2024-12-10 DIAGNOSIS — I10 ESSENTIAL (PRIMARY) HYPERTENSION: ICD-10-CM

## 2024-12-10 DIAGNOSIS — R68.89 OTHER GENERAL SYMPTOMS AND SIGNS: ICD-10-CM

## 2024-12-10 DIAGNOSIS — E66.811 OBESITY, CLASS 1: ICD-10-CM

## 2024-12-10 DIAGNOSIS — Z79.899 OTHER LONG TERM (CURRENT) DRUG THERAPY: ICD-10-CM

## 2024-12-10 DIAGNOSIS — K76.0 FATTY (CHANGE OF) LIVER, NOT ELSEWHERE CLASSIFIED: ICD-10-CM

## 2024-12-10 DIAGNOSIS — N18.9 CHRONIC KIDNEY DISEASE, UNSPECIFIED: ICD-10-CM

## 2024-12-10 DIAGNOSIS — N20.0 CALCULUS OF KIDNEY: ICD-10-CM

## 2024-12-10 PROCEDURE — 36415 COLL VENOUS BLD VENIPUNCTURE: CPT

## 2024-12-10 PROCEDURE — 99214 OFFICE O/P EST MOD 30 MIN: CPT | Mod: 25

## 2024-12-11 LAB
APPEARANCE: CLEAR
BASOPHILS # BLD AUTO: 0.03 K/UL
BASOPHILS NFR BLD AUTO: 0.6 %
BILIRUBIN URINE: NEGATIVE
BLOOD URINE: NEGATIVE
COLOR: YELLOW
CREAT SPEC-SCNC: 158 MG/DL
EOSINOPHIL # BLD AUTO: 0.06 K/UL
EOSINOPHIL NFR BLD AUTO: 1.2 %
GLUCOSE QUALITATIVE U: NEGATIVE MG/DL
HCT VFR BLD CALC: 46 %
HGB BLD-MCNC: 15.1 G/DL
IMM GRANULOCYTES NFR BLD AUTO: 0.2 %
KETONES URINE: NEGATIVE MG/DL
LEUKOCYTE ESTERASE URINE: NEGATIVE
LYMPHOCYTES # BLD AUTO: 1.72 K/UL
LYMPHOCYTES NFR BLD AUTO: 34.3 %
MAN DIFF?: NORMAL
MCHC RBC-ENTMCNC: 27.7 PG
MCHC RBC-ENTMCNC: 32.8 G/DL
MCV RBC AUTO: 84.4 FL
MICROALBUMIN 24H UR DL<=1MG/L-MCNC: 1.7 MG/DL
MICROALBUMIN/CREAT 24H UR-RTO: 11 MG/G
MONOCYTES # BLD AUTO: 0.35 K/UL
MONOCYTES NFR BLD AUTO: 7 %
NEUTROPHILS # BLD AUTO: 2.85 K/UL
NEUTROPHILS NFR BLD AUTO: 56.7 %
NITRITE URINE: NEGATIVE
PH URINE: 5.5
PLATELET # BLD AUTO: 193 K/UL
PROTEIN URINE: NEGATIVE MG/DL
RBC # BLD: 5.45 M/UL
RBC # FLD: 13.3 %
SPECIFIC GRAVITY URINE: 1.02
UROBILINOGEN URINE: 0.2 MG/DL
WBC # FLD AUTO: 5.02 K/UL

## 2025-01-07 ENCOUNTER — APPOINTMENT (OUTPATIENT)
Dept: HEPATOLOGY | Facility: CLINIC | Age: 60
End: 2025-01-07
Payer: MEDICAID

## 2025-01-07 VITALS
HEART RATE: 82 BPM | SYSTOLIC BLOOD PRESSURE: 125 MMHG | DIASTOLIC BLOOD PRESSURE: 87 MMHG | HEIGHT: 72 IN | BODY MASS INDEX: 33.72 KG/M2 | OXYGEN SATURATION: 95 % | RESPIRATION RATE: 16 BRPM | TEMPERATURE: 97.9 F | WEIGHT: 249 LBS

## 2025-01-07 DIAGNOSIS — R79.89 OTHER SPECIFIED ABNORMAL FINDINGS OF BLOOD CHEMISTRY: ICD-10-CM

## 2025-01-07 DIAGNOSIS — K76.0 FATTY (CHANGE OF) LIVER, NOT ELSEWHERE CLASSIFIED: ICD-10-CM

## 2025-01-07 PROCEDURE — 76981 USE PARENCHYMA: CPT

## 2025-01-07 PROCEDURE — 99214 OFFICE O/P EST MOD 30 MIN: CPT

## 2025-01-07 RX ORDER — SEMAGLUTIDE 0.68 MG/ML
2 INJECTION, SOLUTION SUBCUTANEOUS
Qty: 1 | Refills: 2 | Status: ACTIVE | COMMUNITY
Start: 2025-01-07 | End: 1900-01-01

## 2025-02-28 ENCOUNTER — RX RENEWAL (OUTPATIENT)
Age: 60
End: 2025-02-28

## 2025-03-26 ENCOUNTER — APPOINTMENT (OUTPATIENT)
Dept: INTERNAL MEDICINE | Facility: CLINIC | Age: 60
End: 2025-03-26
Payer: MEDICAID

## 2025-03-26 ENCOUNTER — NON-APPOINTMENT (OUTPATIENT)
Age: 60
End: 2025-03-26

## 2025-03-26 VITALS
OXYGEN SATURATION: 95 % | HEIGHT: 72 IN | BODY MASS INDEX: 33.72 KG/M2 | HEART RATE: 88 BPM | SYSTOLIC BLOOD PRESSURE: 120 MMHG | DIASTOLIC BLOOD PRESSURE: 87 MMHG | WEIGHT: 249 LBS | TEMPERATURE: 97.3 F

## 2025-03-26 DIAGNOSIS — R79.89 OTHER SPECIFIED ABNORMAL FINDINGS OF BLOOD CHEMISTRY: ICD-10-CM

## 2025-03-26 DIAGNOSIS — Z12.5 ENCOUNTER FOR SCREENING FOR MALIGNANT NEOPLASM OF PROSTATE: ICD-10-CM

## 2025-03-26 DIAGNOSIS — B02.29 OTHER POSTHERPETIC NERVOUS SYSTEM INVOLVEMENT: ICD-10-CM

## 2025-03-26 DIAGNOSIS — N18.9 CHRONIC KIDNEY DISEASE, UNSPECIFIED: ICD-10-CM

## 2025-03-26 DIAGNOSIS — Z11.3 ENCOUNTER FOR SCREENING FOR INFECTIONS WITH A PREDOMINANTLY SEXUAL MODE OF TRANSMISSION: ICD-10-CM

## 2025-03-26 DIAGNOSIS — R73.02 IMPAIRED GLUCOSE TOLERANCE (ORAL): ICD-10-CM

## 2025-03-26 DIAGNOSIS — E04.9 NONTOXIC GOITER, UNSPECIFIED: ICD-10-CM

## 2025-03-26 DIAGNOSIS — Z00.00 ENCOUNTER FOR GENERAL ADULT MEDICAL EXAMINATION W/OUT ABNORMAL FINDINGS: ICD-10-CM

## 2025-03-26 DIAGNOSIS — E78.00 PURE HYPERCHOLESTEROLEMIA, UNSPECIFIED: ICD-10-CM

## 2025-03-26 DIAGNOSIS — I10 ESSENTIAL (PRIMARY) HYPERTENSION: ICD-10-CM

## 2025-03-26 DIAGNOSIS — Z12.11 ENCOUNTER FOR SCREENING FOR MALIGNANT NEOPLASM OF COLON: ICD-10-CM

## 2025-03-26 PROCEDURE — 93000 ELECTROCARDIOGRAM COMPLETE: CPT | Mod: 59

## 2025-03-26 PROCEDURE — 36415 COLL VENOUS BLD VENIPUNCTURE: CPT

## 2025-03-26 PROCEDURE — G0444 DEPRESSION SCREEN ANNUAL: CPT | Mod: 59

## 2025-03-26 PROCEDURE — 99396 PREV VISIT EST AGE 40-64: CPT

## 2025-03-27 LAB
ALBUMIN SERPL ELPH-MCNC: 4.9 G/DL
ALP BLD-CCNC: 64 U/L
ALT SERPL-CCNC: 26 U/L
ANION GAP SERPL CALC-SCNC: 17 MMOL/L
AST SERPL-CCNC: 28 U/L
BILIRUB SERPL-MCNC: 0.6 MG/DL
BUN SERPL-MCNC: 18 MG/DL
CALCIUM SERPL-MCNC: 10 MG/DL
CHLORIDE SERPL-SCNC: 102 MMOL/L
CHOLEST SERPL-MCNC: 150 MG/DL
CO2 SERPL-SCNC: 24 MMOL/L
CREAT SERPL-MCNC: 1.22 MG/DL
CREAT SPEC-SCNC: 164 MG/DL
EGFRCR SERPLBLD CKD-EPI 2021: 68 ML/MIN/1.73M2
ESTIMATED AVERAGE GLUCOSE: 120 MG/DL
GLUCOSE SERPL-MCNC: 101 MG/DL
HBA1C MFR BLD HPLC: 5.8 %
HCT VFR BLD CALC: 47.1 %
HCV AB SER QL: NONREACTIVE
HCV S/CO RATIO: 0.14 S/CO
HDLC SERPL-MCNC: 47 MG/DL
HGB BLD-MCNC: 15.8 G/DL
HIV1+2 AB SPEC QL IA.RAPID: NONREACTIVE
LDLC SERPL-MCNC: 76 MG/DL
MCHC RBC-ENTMCNC: 28 PG
MCHC RBC-ENTMCNC: 33.5 G/DL
MCV RBC AUTO: 83.4 FL
MICROALBUMIN 24H UR DL<=1MG/L-MCNC: 1.6 MG/DL
MICROALBUMIN/CREAT 24H UR-RTO: 10 MG/G
NONHDLC SERPL-MCNC: 103 MG/DL
PLATELET # BLD AUTO: 225 K/UL
POTASSIUM SERPL-SCNC: 3.8 MMOL/L
PROT SERPL-MCNC: 7.6 G/DL
PSA SERPL-MCNC: 0.71 NG/ML
RBC # BLD: 5.65 M/UL
RBC # FLD: 12.9 %
SODIUM SERPL-SCNC: 143 MMOL/L
TRIGL SERPL-MCNC: 159 MG/DL
TSH SERPL-ACNC: 3.63 UIU/ML
WBC # FLD AUTO: 6.44 K/UL

## 2025-04-15 ENCOUNTER — APPOINTMENT (OUTPATIENT)
Dept: NEPHROLOGY | Facility: CLINIC | Age: 60
End: 2025-04-15
Payer: MEDICAID

## 2025-04-15 VITALS — HEART RATE: 77 BPM | DIASTOLIC BLOOD PRESSURE: 79 MMHG | SYSTOLIC BLOOD PRESSURE: 106 MMHG

## 2025-04-15 VITALS — WEIGHT: 251 LBS | BODY MASS INDEX: 34.04 KG/M2

## 2025-04-15 VITALS — DIASTOLIC BLOOD PRESSURE: 88 MMHG | HEART RATE: 88 BPM | SYSTOLIC BLOOD PRESSURE: 117 MMHG

## 2025-04-15 DIAGNOSIS — I10 ESSENTIAL (PRIMARY) HYPERTENSION: ICD-10-CM

## 2025-04-15 DIAGNOSIS — R79.9 ABNORMAL FINDING OF BLOOD CHEMISTRY, UNSPECIFIED: ICD-10-CM

## 2025-04-15 DIAGNOSIS — N18.9 CHRONIC KIDNEY DISEASE, UNSPECIFIED: ICD-10-CM

## 2025-04-15 DIAGNOSIS — Z79.899 OTHER LONG TERM (CURRENT) DRUG THERAPY: ICD-10-CM

## 2025-04-15 DIAGNOSIS — E66.811 OBESITY, CLASS 1: ICD-10-CM

## 2025-04-15 DIAGNOSIS — N20.0 CALCULUS OF KIDNEY: ICD-10-CM

## 2025-04-15 DIAGNOSIS — K76.0 FATTY (CHANGE OF) LIVER, NOT ELSEWHERE CLASSIFIED: ICD-10-CM

## 2025-04-15 DIAGNOSIS — R79.89 OTHER SPECIFIED ABNORMAL FINDINGS OF BLOOD CHEMISTRY: ICD-10-CM

## 2025-04-15 PROCEDURE — G2211 COMPLEX E/M VISIT ADD ON: CPT | Mod: NC

## 2025-04-15 PROCEDURE — 99214 OFFICE O/P EST MOD 30 MIN: CPT

## 2025-07-15 ENCOUNTER — APPOINTMENT (OUTPATIENT)
Dept: NEPHROLOGY | Facility: CLINIC | Age: 60
End: 2025-07-15
Payer: MEDICAID

## 2025-07-15 VITALS — HEART RATE: 83 BPM | DIASTOLIC BLOOD PRESSURE: 80 MMHG | SYSTOLIC BLOOD PRESSURE: 120 MMHG

## 2025-07-15 PROCEDURE — 99214 OFFICE O/P EST MOD 30 MIN: CPT | Mod: 25

## 2025-07-15 PROCEDURE — 36415 COLL VENOUS BLD VENIPUNCTURE: CPT

## 2025-07-17 LAB
25(OH)D3 SERPL-MCNC: 41.6 NG/ML
ALBUMIN SERPL ELPH-MCNC: 5 G/DL
ALBUMIN, RANDOM URINE: <1.2 MG/DL
ALP BLD-CCNC: 62 U/L
ALT SERPL-CCNC: 39 U/L
ANION GAP SERPL CALC-SCNC: 16 MMOL/L
APPEARANCE: CLEAR
AST SERPL-CCNC: 43 U/L
BASOPHILS # BLD AUTO: 0.04 K/UL
BASOPHILS NFR BLD AUTO: 0.7 %
BILIRUB SERPL-MCNC: 0.8 MG/DL
BILIRUBIN URINE: NEGATIVE
BLOOD URINE: NEGATIVE
BUN SERPL-MCNC: 13 MG/DL
CALCIUM SERPL-MCNC: 9.7 MG/DL
CALCIUM SERPL-MCNC: 9.7 MG/DL
CHLORIDE SERPL-SCNC: 104 MMOL/L
CHOLEST SERPL-MCNC: 158 MG/DL
CO2 SERPL-SCNC: 22 MMOL/L
COLOR: YELLOW
CREAT SERPL-MCNC: 1.31 MG/DL
CREAT SPEC-SCNC: 127 MG/DL
CREAT SPEC-SCNC: 127 MG/DL
CREAT/PROT UR: 0.1 RATIO
CYSTATIN C SERPL-MCNC: 0.85 MG/L
EGFRCR SERPLBLD CKD-EPI 2021: 63 ML/MIN/1.73M2
EOSINOPHIL # BLD AUTO: 0.08 K/UL
EOSINOPHIL NFR BLD AUTO: 1.4 %
ESTIMATED AVERAGE GLUCOSE: 123 MG/DL
FERRITIN SERPL-MCNC: 424 NG/ML
GFR/BSA.PRED SERPLBLD CYS-BASED-ARV: 97 ML/MIN/1.73M2
GLUCOSE QUALITATIVE U: NEGATIVE MG/DL
GLUCOSE SERPL-MCNC: 117 MG/DL
HBA1C MFR BLD HPLC: 5.9 %
HCT VFR BLD CALC: 42.8 %
HDLC SERPL-MCNC: 44 MG/DL
HGB BLD-MCNC: 14.7 G/DL
IMM GRANULOCYTES NFR BLD AUTO: 0.3 %
KETONES URINE: NEGATIVE MG/DL
LDLC SERPL-MCNC: 88 MG/DL
LEUKOCYTE ESTERASE URINE: NEGATIVE
LYMPHOCYTES # BLD AUTO: 2.12 K/UL
LYMPHOCYTES NFR BLD AUTO: 36.1 %
MAGNESIUM SERPL-MCNC: 2.2 MG/DL
MAN DIFF?: NORMAL
MCHC RBC-ENTMCNC: 27.8 PG
MCHC RBC-ENTMCNC: 34.3 G/DL
MCV RBC AUTO: 80.9 FL
MICROALBUMIN/CREAT 24H UR-RTO: NORMAL MG/G
MONOCYTES # BLD AUTO: 0.4 K/UL
MONOCYTES NFR BLD AUTO: 6.8 %
NEUTROPHILS # BLD AUTO: 3.22 K/UL
NEUTROPHILS NFR BLD AUTO: 54.7 %
NITRITE URINE: NEGATIVE
NONHDLC SERPL-MCNC: 114 MG/DL
PARATHYROID HORMONE INTACT: 18 PG/ML
PH URINE: 7
PHOSPHATE SERPL-MCNC: 3 MG/DL
PLATELET # BLD AUTO: 196 K/UL
POTASSIUM SERPL-SCNC: 4 MMOL/L
PROT SERPL-MCNC: 7.4 G/DL
PROT UR-MCNC: 9 MG/DL
PROTEIN URINE: NEGATIVE MG/DL
RBC # BLD: 5.29 M/UL
RBC # FLD: 13.3 %
SODIUM SERPL-SCNC: 142 MMOL/L
SPECIFIC GRAVITY URINE: 1.02
TRIGL SERPL-MCNC: 150 MG/DL
TSH SERPL-ACNC: 2.78 UIU/ML
UROBILINOGEN URINE: 0.2 MG/DL
VIT B12 SERPL-MCNC: 824 PG/ML
WBC # FLD AUTO: 5.88 K/UL